# Patient Record
Sex: MALE | Race: BLACK OR AFRICAN AMERICAN | NOT HISPANIC OR LATINO
[De-identification: names, ages, dates, MRNs, and addresses within clinical notes are randomized per-mention and may not be internally consistent; named-entity substitution may affect disease eponyms.]

---

## 2021-02-14 ENCOUNTER — FORM ENCOUNTER (OUTPATIENT)
Age: 86
End: 2021-02-14

## 2021-02-15 ENCOUNTER — TRANSCRIPTION ENCOUNTER (OUTPATIENT)
Age: 86
End: 2021-02-15

## 2021-02-15 PROBLEM — Z00.00 ENCOUNTER FOR PREVENTIVE HEALTH EXAMINATION: Status: ACTIVE | Noted: 2021-02-15

## 2021-02-16 ENCOUNTER — APPOINTMENT (OUTPATIENT)
Dept: DISASTER EMERGENCY | Facility: HOSPITAL | Age: 86
End: 2021-02-16

## 2021-02-16 ENCOUNTER — OUTPATIENT (OUTPATIENT)
Dept: OUTPATIENT SERVICES | Facility: HOSPITAL | Age: 86
LOS: 1 days | End: 2021-02-16
Payer: MEDICARE

## 2021-02-16 VITALS
OXYGEN SATURATION: 99 % | HEART RATE: 74 BPM | DIASTOLIC BLOOD PRESSURE: 69 MMHG | RESPIRATION RATE: 16 BRPM | TEMPERATURE: 100 F | SYSTOLIC BLOOD PRESSURE: 106 MMHG

## 2021-02-16 VITALS
OXYGEN SATURATION: 98 % | DIASTOLIC BLOOD PRESSURE: 72 MMHG | SYSTOLIC BLOOD PRESSURE: 118 MMHG | HEART RATE: 98 BPM | WEIGHT: 139.99 LBS | RESPIRATION RATE: 16 BRPM | TEMPERATURE: 98 F | HEIGHT: 72 IN

## 2021-02-16 DIAGNOSIS — U07.1 COVID-19: ICD-10-CM

## 2021-02-16 PROCEDURE — M0239: CPT

## 2021-02-16 RX ORDER — SODIUM CHLORIDE 9 MG/ML
250 INJECTION INTRAMUSCULAR; INTRAVENOUS; SUBCUTANEOUS
Refills: 0 | Status: DISCONTINUED | OUTPATIENT
Start: 2021-02-16 | End: 2021-03-02

## 2021-02-16 RX ORDER — BAMLANIVIMAB 35 MG/ML
700 INJECTION, SOLUTION INTRAVENOUS ONCE
Refills: 0 | Status: COMPLETED | OUTPATIENT
Start: 2021-02-16 | End: 2021-02-16

## 2021-02-16 RX ADMIN — BAMLANIVIMAB 270 MILLIGRAM(S): 35 INJECTION, SOLUTION INTRAVENOUS at 09:35

## 2021-02-16 RX ADMIN — SODIUM CHLORIDE 25 MILLILITER(S): 9 INJECTION INTRAMUSCULAR; INTRAVENOUS; SUBCUTANEOUS at 10:39

## 2021-02-16 NOTE — CHART NOTE - NSCHARTNOTEFT_GEN_A_CORE
CC: Monoclonal Antibody Infusion/COVID 19 Positive  87yMale with a PMHx of dementia, Brain injury, ?stroke (~2 years ago). Testing positive for COVID 2/11/21 with symptom onset 2/10/21. C/o fever, cough, malaise, body ached, fatigue. He presents today for monoclonal antibody infusion referred by Dr. Sharma (PMD).     Vitals:  T(C): 36.9 (02-16-21 @ 09:34), Max: 36.9 (02-16-21 @ 09:19)  HR: 78 (02-16-21 @ 09:34) (78 - 98)  BP: 105/67 (02-16-21 @ 09:34) (105/67 - 118/72)  RR: 16 (02-16-21 @ 09:34) (16 - 16)  SpO2: 97% (02-16-21 @ 09:34) (97% - 98%)      PE:   Appearance: NAD	  Cardiovascular: Normal S1 S2  Respiratory: Lungs clear to auscultation	  Gastrointestinal:  Soft, Non-tender  Skin: warm and dry      ASSESSMENT:  Pt is a Covid +>>>>>>>  who presents to infusion center for Monoclonal antibody infusion (Bamlanivimab)  Symptoms/ Criteria: Onset   Covid + fever, cough, malaise, body ached, fatigue  Risk Profile includes: age > 65, dementia, hx of stroke    PLAN:  - infusion procedure explained to patient   -Consent for monoclonal antibody infusion obtained   - Risk & benefits discussed/all questions answered  -infuse  Bamlanivimab 700mg  IV over one hour   -observe patient for one hour post infusion      Discharge:  Patient tolerated infusion well denies complaints of chest pain/SOB/dizziness/  palpitations.  Vital signs stable for discharge home.  D/C instructions given/ fact sheet included.  Patient to follow-up with PCP as needed.

## 2021-02-17 ENCOUNTER — TRANSCRIPTION ENCOUNTER (OUTPATIENT)
Age: 86
End: 2021-02-17

## 2022-03-29 ENCOUNTER — APPOINTMENT (OUTPATIENT)
Dept: VASCULAR SURGERY | Facility: CLINIC | Age: 87
End: 2022-03-29
Payer: MEDICARE

## 2022-03-29 VITALS
WEIGHT: 155 LBS | SYSTOLIC BLOOD PRESSURE: 107 MMHG | BODY MASS INDEX: 21.7 KG/M2 | HEIGHT: 71 IN | HEART RATE: 79 BPM | DIASTOLIC BLOOD PRESSURE: 63 MMHG

## 2022-03-29 PROCEDURE — 99204 OFFICE O/P NEW MOD 45 MIN: CPT

## 2022-03-29 PROCEDURE — 93923 UPR/LXTR ART STDY 3+ LVLS: CPT

## 2022-03-29 NOTE — PHYSICAL EXAM
[JVD] : no jugular venous distention  [Normal Breath Sounds] : Normal breath sounds [Normal Rate and Rhythm] : normal rate and rhythm [2+] : left 2+ [Ankle Swelling (On Exam)] : not present [Varicose Veins Of Lower Extremities] : not present [] : not present [Abdomen Tenderness] : ~T ~M No abdominal tenderness [No Rash or Lesion] : No rash or lesion [Skin Ulcer] : no ulcer [Alert] : alert [Calm] : calm [de-identified] : Appears well

## 2022-03-29 NOTE — HISTORY OF PRESENT ILLNESS
[FreeTextEntry1] : 88-year-old gentleman with dementia, non-smoker presents to the office with worsening ambulation.  Patient now has issues with balance and requires use of a walker.  Patient does not have any history of ulcerations or pain in his feet.  He has a history of a pacemaker for which she is on anticoagulation.  He presents for evaluation.

## 2022-03-29 NOTE — ASSESSMENT
[FreeTextEntry1] : In the office today patient underwent arterial Dopplers which were within normal limits.  Most likely his difficulty in ambulating is due to arthritis with worsening dementia.  No vascular intervention necessary at this time.  Patient should remain on his Xarelto for cardiac issues. [Arterial/Venous Disease] : arterial/venous disease

## 2023-08-24 ENCOUNTER — INPATIENT (INPATIENT)
Facility: HOSPITAL | Age: 88
LOS: 6 days | Discharge: HOME HEALTH | DRG: 872 | End: 2023-08-31
Attending: STUDENT IN AN ORGANIZED HEALTH CARE EDUCATION/TRAINING PROGRAM | Admitting: HOSPITALIST
Payer: MEDICARE

## 2023-08-24 VITALS
TEMPERATURE: 98 F | DIASTOLIC BLOOD PRESSURE: 74 MMHG | OXYGEN SATURATION: 98 % | RESPIRATION RATE: 20 BRPM | SYSTOLIC BLOOD PRESSURE: 107 MMHG | HEART RATE: 86 BPM | HEIGHT: 73 IN | WEIGHT: 169.98 LBS

## 2023-08-24 DIAGNOSIS — N39.0 URINARY TRACT INFECTION, SITE NOT SPECIFIED: ICD-10-CM

## 2023-08-24 LAB
ALBUMIN SERPL ELPH-MCNC: 3.3 G/DL — SIGNIFICANT CHANGE UP (ref 3.3–5)
ALP SERPL-CCNC: 54 U/L — SIGNIFICANT CHANGE UP (ref 40–120)
ALT FLD-CCNC: 10 U/L — SIGNIFICANT CHANGE UP (ref 10–45)
ANION GAP SERPL CALC-SCNC: 14 MMOL/L — SIGNIFICANT CHANGE UP (ref 5–17)
APPEARANCE UR: ABNORMAL
APTT BLD: 34.5 SEC — SIGNIFICANT CHANGE UP (ref 24.5–35.6)
AST SERPL-CCNC: 23 U/L — SIGNIFICANT CHANGE UP (ref 10–40)
BACTERIA # UR AUTO: ABNORMAL
BASOPHILS # BLD AUTO: 0.05 K/UL — SIGNIFICANT CHANGE UP (ref 0–0.2)
BASOPHILS NFR BLD AUTO: 0.4 % — SIGNIFICANT CHANGE UP (ref 0–2)
BILIRUB SERPL-MCNC: 0.8 MG/DL — SIGNIFICANT CHANGE UP (ref 0.2–1.2)
BILIRUB UR-MCNC: ABNORMAL
BLD GP AB SCN SERPL QL: NEGATIVE — SIGNIFICANT CHANGE UP
BUN SERPL-MCNC: 17 MG/DL — SIGNIFICANT CHANGE UP (ref 7–23)
CALCIUM SERPL-MCNC: 9.3 MG/DL — SIGNIFICANT CHANGE UP (ref 8.4–10.5)
CHLORIDE SERPL-SCNC: 104 MMOL/L — SIGNIFICANT CHANGE UP (ref 96–108)
CO2 SERPL-SCNC: 24 MMOL/L — SIGNIFICANT CHANGE UP (ref 22–31)
COLOR SPEC: ABNORMAL
CREAT SERPL-MCNC: 0.58 MG/DL — SIGNIFICANT CHANGE UP (ref 0.5–1.3)
DIFF PNL FLD: ABNORMAL
EGFR: 93 ML/MIN/1.73M2 — SIGNIFICANT CHANGE UP
EOSINOPHIL # BLD AUTO: 0.38 K/UL — SIGNIFICANT CHANGE UP (ref 0–0.5)
EOSINOPHIL NFR BLD AUTO: 3.1 % — SIGNIFICANT CHANGE UP (ref 0–6)
EPI CELLS # UR: 0 — SIGNIFICANT CHANGE UP
GLUCOSE SERPL-MCNC: 97 MG/DL — SIGNIFICANT CHANGE UP (ref 70–99)
GLUCOSE UR QL: ABNORMAL
HCT VFR BLD CALC: 37 % — LOW (ref 39–50)
HGB BLD-MCNC: 11.3 G/DL — LOW (ref 13–17)
HYALINE CASTS # UR AUTO: 0 /LPF — SIGNIFICANT CHANGE UP (ref 0–7)
IMM GRANULOCYTES NFR BLD AUTO: 0.7 % — SIGNIFICANT CHANGE UP (ref 0–0.9)
INR BLD: 1.36 RATIO — HIGH (ref 0.85–1.18)
KETONES UR-MCNC: ABNORMAL
LEUKOCYTE ESTERASE UR-ACNC: ABNORMAL
LIDOCAIN IGE QN: 11 U/L — SIGNIFICANT CHANGE UP (ref 7–60)
LYMPHOCYTES # BLD AUTO: 1.53 K/UL — SIGNIFICANT CHANGE UP (ref 1–3.3)
LYMPHOCYTES # BLD AUTO: 12.5 % — LOW (ref 13–44)
MCHC RBC-ENTMCNC: 26.5 PG — LOW (ref 27–34)
MCHC RBC-ENTMCNC: 30.5 GM/DL — LOW (ref 32–36)
MCV RBC AUTO: 86.7 FL — SIGNIFICANT CHANGE UP (ref 80–100)
MONOCYTES # BLD AUTO: 1.4 K/UL — HIGH (ref 0–0.9)
MONOCYTES NFR BLD AUTO: 11.4 % — SIGNIFICANT CHANGE UP (ref 2–14)
NEUTROPHILS # BLD AUTO: 8.84 K/UL — HIGH (ref 1.8–7.4)
NEUTROPHILS NFR BLD AUTO: 71.9 % — SIGNIFICANT CHANGE UP (ref 43–77)
NITRITE UR-MCNC: POSITIVE
NRBC # BLD: 0 /100 WBCS — SIGNIFICANT CHANGE UP (ref 0–0)
PH UR: >8.9 (ref 5–8)
PLATELET # BLD AUTO: 285 K/UL — SIGNIFICANT CHANGE UP (ref 150–400)
POTASSIUM SERPL-MCNC: 4.5 MMOL/L — SIGNIFICANT CHANGE UP (ref 3.5–5.3)
POTASSIUM SERPL-SCNC: 4.5 MMOL/L — SIGNIFICANT CHANGE UP (ref 3.5–5.3)
PROT SERPL-MCNC: 6.9 G/DL — SIGNIFICANT CHANGE UP (ref 6–8.3)
PROT UR-MCNC: ABNORMAL
PROTHROM AB SERPL-ACNC: 14.2 SEC — HIGH (ref 9.5–13)
RBC # BLD: 4.27 M/UL — SIGNIFICANT CHANGE UP (ref 4.2–5.8)
RBC # FLD: 15.6 % — HIGH (ref 10.3–14.5)
RBC CASTS # UR COMP ASSIST: >50 /HPF — HIGH (ref 0–4)
RH IG SCN BLD-IMP: POSITIVE — SIGNIFICANT CHANGE UP
SODIUM SERPL-SCNC: 142 MMOL/L — SIGNIFICANT CHANGE UP (ref 135–145)
SP GR SPEC: 1.03 — HIGH (ref 1.01–1.02)
UROBILINOGEN FLD QL: ABNORMAL
WBC # BLD: 12.28 K/UL — HIGH (ref 3.8–10.5)
WBC # FLD AUTO: 12.28 K/UL — HIGH (ref 3.8–10.5)
WBC UR QL: >50 /HPF — HIGH (ref 0–5)

## 2023-08-24 PROCEDURE — 99222 1ST HOSP IP/OBS MODERATE 55: CPT

## 2023-08-24 PROCEDURE — 99497 ADVNCD CARE PLAN 30 MIN: CPT | Mod: 25

## 2023-08-24 PROCEDURE — 74177 CT ABD & PELVIS W/CONTRAST: CPT | Mod: 26,MA

## 2023-08-24 PROCEDURE — 99285 EMERGENCY DEPT VISIT HI MDM: CPT

## 2023-08-24 RX ORDER — CEFTRIAXONE 500 MG/1
1000 INJECTION, POWDER, FOR SOLUTION INTRAMUSCULAR; INTRAVENOUS ONCE
Refills: 0 | Status: COMPLETED | OUTPATIENT
Start: 2023-08-24 | End: 2023-08-24

## 2023-08-24 RX ORDER — CEFTRIAXONE 500 MG/1
1000 INJECTION, POWDER, FOR SOLUTION INTRAMUSCULAR; INTRAVENOUS EVERY 24 HOURS
Refills: 0 | Status: DISCONTINUED | OUTPATIENT
Start: 2023-08-24 | End: 2023-08-28

## 2023-08-24 RX ORDER — SODIUM CHLORIDE 9 MG/ML
500 INJECTION, SOLUTION INTRAVENOUS
Refills: 0 | Status: COMPLETED | OUTPATIENT
Start: 2023-08-24 | End: 2023-08-25

## 2023-08-24 RX ORDER — FAMOTIDINE 10 MG/ML
1 INJECTION INTRAVENOUS
Refills: 0 | DISCHARGE

## 2023-08-24 RX ORDER — SODIUM CHLORIDE 9 MG/ML
500 INJECTION INTRAMUSCULAR; INTRAVENOUS; SUBCUTANEOUS ONCE
Refills: 0 | Status: COMPLETED | OUTPATIENT
Start: 2023-08-24 | End: 2023-08-24

## 2023-08-24 RX ADMIN — CEFTRIAXONE 100 MILLIGRAM(S): 500 INJECTION, POWDER, FOR SOLUTION INTRAMUSCULAR; INTRAVENOUS at 23:04

## 2023-08-24 RX ADMIN — SODIUM CHLORIDE 500 MILLILITER(S): 9 INJECTION INTRAMUSCULAR; INTRAVENOUS; SUBCUTANEOUS at 23:06

## 2023-08-24 NOTE — ED ADULT NURSE REASSESSMENT NOTE - NS ED NURSE REASSESS COMMENT FT1
Report received from HILARIO Muller. PT is A&Ox2 at baseline. PT daughter at bedside. Pt denies any pain or discomfort at present time. Pt Monroe draining bright red blood 200cc present. Spontaneously breathing on RA>95%, skin dry and intact, peripheral pulses present. Safety and comfort measures in place bed in lowest position, siderails, upright and call bell within reach.

## 2023-08-24 NOTE — H&P ADULT - HISTORY OF PRESENT ILLNESS
89M dementia A&Ox1 baseline, Afib on xarelto, PPM placement, pleural plaques, aspiration pna in past, chronic cough, chronic mild leg swelling, pw gross hematuria.    History from pt's daughter/HCP Luba La at bedside. Pt is poor historian 2/2 dementia and drowsiness.  At baseline pt walks with walker and assistance, feeds self, able to make some needs known. Developed gross hematuria about 1 week ago, was started on macrobid on 8/16/23, which pt is currently taking, and told to stop xarelto for 2 days. Pt resumed xarelto about 6 days ago, and pt developed gross hematuria starting yesterday with clots. Pt also complaining of intermittent abd pain and flank pain. Denies fevers, chills, URI symptoms. Last BM 2 days ago.

## 2023-08-24 NOTE — ED PROVIDER NOTE - PHYSICAL EXAMINATION
GENERAL: Awake, alert, NAD  HEENT: NC/AT, moist mucous membranes, PERRL, EOMI  LUNGS: CTAB, no wheezes or crackles   CARDIAC: RRR, no m/r/g  ABDOMEN: Soft, Diffusely tender, non distended, no rebound, no guarding  BACK: No midline spinal tenderness, no CVA tenderness  EXT: No edema, no calf tenderness, 2+ DP pulses bilaterally, no deformities.  NEURO: A&Ox2. Moving all extremities.  SKIN: Warm and dry. No rash.  PSYCH: Normal affect.

## 2023-08-24 NOTE — ED PROVIDER NOTE - OBJECTIVE STATEMENT
89-year-old male history of dementia and A-fib on Xarelto presenting with a chief complaint of hematuria.  Patient's hematuria began 1 week ago and at that time he was prescribed antibiotics for suspected urinary tract infection.  Daughter is at bedside and gives the history.  Hematuria did stop but then returned again this morning.  Patient was advised to present to the hospital.  Patient also having abdominal pain.  Unsure of when abdominal pain began.    ROS is limited secondary to patient's dementia.

## 2023-08-24 NOTE — H&P ADULT - PROBLEM SELECTOR PLAN 1
- hold xarelto  - likely 2/2 hemorrhagic cystitis  - monitor hb, type and screen  - hold off CBI  - trend cr  - IVF - hold xarelto  - likely 2/2 hemorrhagic cystitis  - monitor hb, type and screen  - hold off CBI  - trend cr  - IVF  - TOV in AM  - f/u urol as outpt

## 2023-08-24 NOTE — H&P ADULT - BILLING PROVIDER USE ONLY
Start metamucil/benefiber and fibercon capsules.  Increase water intake.    Start B12 supplement (nature made is a good brand).    Start exercising ASAP.   Attending or LETICIA Only

## 2023-08-24 NOTE — ED PROVIDER NOTE - CLINICAL SUMMARY MEDICAL DECISION MAKING FREE TEXT BOX
Differential is not limited to malignancy, urinary tract infection, bleeding secondary to Xarelto use, nephrolithiasis.  Will obtain basic labs, coags, type and screen, CT abdomen and pelvis.  Dispo pending labs imaging and reassessment.  Will consider putting Monroe in patient if patient cannot urinate. Differential is not limited to malignancy, urinary tract infection, bleeding secondary to Xarelto use, nephrolithiasis.  Will obtain basic labs, coags, type and screen, CT abdomen and pelvis.  Dispo pending labs imaging and reassessment.  Will consider putting Monroe in patient if patient cannot urinate.    ALEX Ramirez MD: Agree with resident/ACP MDM, assessment and plan as above.

## 2023-08-24 NOTE — ED ADULT NURSE NOTE - CHPI ED NUR SYMPTOMS POS
BURNING URINATION/DYSURIA/HEMATURIA/PAINFUL URINATION BURNING URINATION/DYSURIA/HEMATURIA/PAIN/PAINFUL URINATION

## 2023-08-24 NOTE — ED ADULT NURSE NOTE - OBJECTIVE STATEMENT
Patient  is  alert  and  oriented x3.  Color is good and skin warm to touch.  He  is  c/o dysuria, hematuria and pain .Patient is on Xarelto. He  denies  any fever. Patient  is  alert  and  oriented x2.  Color is good and skin warm to touch.  He  is  c/o dysuria, hematuria and right flank pain .Patient is on Xarelto. He  denies  any fever.

## 2023-08-24 NOTE — H&P ADULT - NSHPREVIEWOFSYSTEMS_GEN_ALL_CORE
REVIEW OF SYSTEMS:  CONSTITUTIONAL: No weakness. No fevers. No chills. No rigors. +poor appetit for 2 months.  EYES: No blurry or double vision. No eye pain.  ENT: No hearing difficulty. No sore throat. No Sinusitis/rhinorrhea.   NECK: No pain. No stiffness/rigidity.  CARDIAC: No chest pain. No palpitations. No lightheadedness. No syncope.  RESPIRATORY: No cough. No SOB.   GASTROINTESTINAL: +abdominal pain. No nausea. No vomiting. No diarrhea. +constipation.   GENITOURINARY: No dysuria. No frequency. No oliguria. +hematuria  NEUROLOGICAL: No numbness/tingling. No focal weakness. No headache. +unsteady gait.  BACK: No back pain. +flank pain.  EXTREMITIES: +lower extremity edema. Full ROM. No joint pain.    ALLERGIC: No lip swelling. No hives.  All other review of systems is negative unless indicated above.  Unless indicated above, unable to assess ROS 2/2

## 2023-08-24 NOTE — H&P ADULT - NSHPLABSRESULTS_GEN_ALL_CORE
Personally reviewed old records.  Personally reviewed labs.  Personally reviewed imaging. CT with mod RLL pleural effusion, right basilar atelectasis                          11.3   12.28 )-----------( 285      ( 24 Aug 2023 14:26 )             37.0       08    142  |  104  |  17  ----------------------------<  97  4.5   |  24  |  0.58    Ca    9.3      24 Aug 2023 14:26    TPro  6.9  /  Alb  3.3  /  TBili  0.8  /  DBili  x   /  AST  23  /  ALT  10  /  AlkPhos  54              LIVER FUNCTIONS - ( 24 Aug 2023 14:26 )  Alb: 3.3 g/dL / Pro: 6.9 g/dL / ALK PHOS: 54 U/L / ALT: 10 U/L / AST: 23 U/L / GGT: x             PT/INR - ( 24 Aug 2023 14:26 )   PT: 14.2 sec;   INR: 1.36 ratio         PTT - ( 24 Aug 2023 14:26 )  PTT:34.5 sec    Urinalysis Basic - ( 24 Aug 2023 18:58 )    Color: Brown / Appearance: Slightly Turbid / S.035 / pH: x  Gluc: x / Ketone: Small  / Bili: Large / Urobili: 4 mg/dL   Blood: x / Protein: 300 mg/dL / Nitrite: Positive   Leuk Esterase: Large / RBC: >50 /hpf / WBC >50 /HPF   Sq Epi: x / Non Sq Epi: x / Bacteria: Occasional

## 2023-08-24 NOTE — H&P ADULT - PROBLEM SELECTOR PLAN 6
- poss from pleural plaques vs  parapneumonic effusion vs other  - informed pt's daughter that if pt becomes more symptomatic respiratory wise, can get thoracentesis  - hold off workup for malignancy given pt's age and comorbidities, which pt's daughter is in agreement with

## 2023-08-24 NOTE — H&P ADULT - CONVERSATION DETAILS
soham mcgovern requests DNR but NOT DNI for patient. OK for trial of intubation. OK for noninvasive positive pressure  soham mcgovern requests all medical therapy as necessary and indicated

## 2023-08-24 NOTE — ED ADULT NURSE NOTE - NSFALLHARMRISKINTERV_ED_ALL_ED

## 2023-08-24 NOTE — H&P ADULT - PROBLEM SELECTOR PLAN 3
- monitor for hypothermia, trend wbc  - likely 2/2 uti - monitor for hypothermia, trend wbc  - likely 2/2 uti  - blood cultures ordered as pt c/o flank pain

## 2023-08-24 NOTE — H&P ADULT - NSHPPHYSICALEXAM_GEN_ALL_CORE
PHYSICAL EXAM:   GENERAL: Alert. Not confused. No acute distress. +thin. Not cachectic. Not obese.  HEAD:  Atraumatic. Normocephalic.  EYES: EOMI. PERRLA. Normal conjunctiva/sclera.  ENT: Neck supple. No JVD. Moist oral mucosa. Not edentulous. No thrush.  LYMPH: Normal supraclavicular/cervical lymph nodes.   CARDIAC: Not tachy, Not tereza. Regular rhythm. Not irregularly irregular. S1. S2. No murmur. No rub. No distant heart sounds.  LUNG/CHEST: BS equal bilaterally. No wheezes. +rales. No rhonchi.  ABDOMEN: Soft. No tenderness. No distension. No fluid wave. Normal bowel sounds.  BACK: No midline/vertebral tenderness. No flank tenderness.  VASCULAR: +2 b/l radial or ulnar pulses. Palpable DP pulses.  EXTREMITIES:  No clubbing. No cyanosis. No edema. Moving all 4.  NEUROLOGY: A&Ox1. Non-focal exam. answers only some questions with 1 word answers. Sensation intact.  PSYCH: Normal behavior. Flat affect.    T(C): 36.9 (24 Aug 2023 23:50), Max: 37.2 (24 Aug 2023 18:26)  T(F): 98.5 (24 Aug 2023 23:50), Max: 98.9 (24 Aug 2023 18:26)  HR: 74 (24 Aug 2023 23:50) (70 - 86)  BP: 117/68 (24 Aug 2023 23:50) (103/67 - 124/85)  BP(mean): 87 (24 Aug 2023 23:30) (87 - 91)  ABP: --  ABP(mean): --  RR: 18 (24 Aug 2023 23:50) (18 - 20)  SpO2: 92% (24 Aug 2023 23:50) (92% - 98%)    O2 Parameters below as of 24 Aug 2023 23:50  Patient On (Oxygen Delivery Method): room air          I&O's Summary

## 2023-08-24 NOTE — H&P ADULT - ASSESSMENT
89M dementia A&Ox1 baseline, Afib on xarelto, PPM placement, pleural plaques, aspiration pna in past, chronic cough, chronic mild leg swelling, pw gross hematuria, uti, sepsis
No

## 2023-08-25 DIAGNOSIS — N39.0 URINARY TRACT INFECTION, SITE NOT SPECIFIED: ICD-10-CM

## 2023-08-25 DIAGNOSIS — J90 PLEURAL EFFUSION, NOT ELSEWHERE CLASSIFIED: ICD-10-CM

## 2023-08-25 DIAGNOSIS — F03.90 UNSPECIFIED DEMENTIA WITHOUT BEHAVIORAL DISTURBANCE: ICD-10-CM

## 2023-08-25 DIAGNOSIS — I48.20 CHRONIC ATRIAL FIBRILLATION, UNSPECIFIED: ICD-10-CM

## 2023-08-25 DIAGNOSIS — R31.0 GROSS HEMATURIA: ICD-10-CM

## 2023-08-25 DIAGNOSIS — Z29.9 ENCOUNTER FOR PROPHYLACTIC MEASURES, UNSPECIFIED: ICD-10-CM

## 2023-08-25 DIAGNOSIS — A41.9 SEPSIS, UNSPECIFIED ORGANISM: ICD-10-CM

## 2023-08-25 LAB
ALBUMIN SERPL ELPH-MCNC: 3.2 G/DL — LOW (ref 3.3–5)
ALP SERPL-CCNC: 49 U/L — SIGNIFICANT CHANGE UP (ref 40–120)
ALT FLD-CCNC: 6 U/L — LOW (ref 10–45)
ANION GAP SERPL CALC-SCNC: 12 MMOL/L — SIGNIFICANT CHANGE UP (ref 5–17)
AST SERPL-CCNC: 9 U/L — LOW (ref 10–40)
BASOPHILS # BLD AUTO: 0.03 K/UL — SIGNIFICANT CHANGE UP (ref 0–0.2)
BASOPHILS NFR BLD AUTO: 0.3 % — SIGNIFICANT CHANGE UP (ref 0–2)
BILIRUB SERPL-MCNC: 0.6 MG/DL — SIGNIFICANT CHANGE UP (ref 0.2–1.2)
BUN SERPL-MCNC: 13 MG/DL — SIGNIFICANT CHANGE UP (ref 7–23)
CALCIUM SERPL-MCNC: 8.9 MG/DL — SIGNIFICANT CHANGE UP (ref 8.4–10.5)
CHLORIDE SERPL-SCNC: 109 MMOL/L — HIGH (ref 96–108)
CK SERPL-CCNC: 12 U/L — LOW (ref 30–200)
CO2 SERPL-SCNC: 26 MMOL/L — SIGNIFICANT CHANGE UP (ref 22–31)
CREAT SERPL-MCNC: 0.58 MG/DL — SIGNIFICANT CHANGE UP (ref 0.5–1.3)
EGFR: 93 ML/MIN/1.73M2 — SIGNIFICANT CHANGE UP
EOSINOPHIL # BLD AUTO: 0.39 K/UL — SIGNIFICANT CHANGE UP (ref 0–0.5)
EOSINOPHIL NFR BLD AUTO: 3.8 % — SIGNIFICANT CHANGE UP (ref 0–6)
GLUCOSE SERPL-MCNC: 98 MG/DL — SIGNIFICANT CHANGE UP (ref 70–99)
HCT VFR BLD CALC: 31.7 % — LOW (ref 39–50)
HGB BLD-MCNC: 9.8 G/DL — LOW (ref 13–17)
IMM GRANULOCYTES NFR BLD AUTO: 0.6 % — SIGNIFICANT CHANGE UP (ref 0–0.9)
LYMPHOCYTES # BLD AUTO: 1.14 K/UL — SIGNIFICANT CHANGE UP (ref 1–3.3)
LYMPHOCYTES # BLD AUTO: 11.2 % — LOW (ref 13–44)
MAGNESIUM SERPL-MCNC: 2.1 MG/DL — SIGNIFICANT CHANGE UP (ref 1.6–2.6)
MCHC RBC-ENTMCNC: 26.5 PG — LOW (ref 27–34)
MCHC RBC-ENTMCNC: 30.9 GM/DL — LOW (ref 32–36)
MCV RBC AUTO: 85.7 FL — SIGNIFICANT CHANGE UP (ref 80–100)
MONOCYTES # BLD AUTO: 1.4 K/UL — HIGH (ref 0–0.9)
MONOCYTES NFR BLD AUTO: 13.7 % — SIGNIFICANT CHANGE UP (ref 2–14)
NEUTROPHILS # BLD AUTO: 7.18 K/UL — SIGNIFICANT CHANGE UP (ref 1.8–7.4)
NEUTROPHILS NFR BLD AUTO: 70.4 % — SIGNIFICANT CHANGE UP (ref 43–77)
NRBC # BLD: 0 /100 WBCS — SIGNIFICANT CHANGE UP (ref 0–0)
PHOSPHATE SERPL-MCNC: 3.1 MG/DL — SIGNIFICANT CHANGE UP (ref 2.5–4.5)
PLATELET # BLD AUTO: 279 K/UL — SIGNIFICANT CHANGE UP (ref 150–400)
POTASSIUM SERPL-MCNC: 4.5 MMOL/L — SIGNIFICANT CHANGE UP (ref 3.5–5.3)
POTASSIUM SERPL-SCNC: 4.5 MMOL/L — SIGNIFICANT CHANGE UP (ref 3.5–5.3)
PROT SERPL-MCNC: 6.3 G/DL — SIGNIFICANT CHANGE UP (ref 6–8.3)
RBC # BLD: 3.7 M/UL — LOW (ref 4.2–5.8)
RBC # FLD: 15.5 % — HIGH (ref 10.3–14.5)
SODIUM SERPL-SCNC: 147 MMOL/L — HIGH (ref 135–145)
WBC # BLD: 10.2 K/UL — SIGNIFICANT CHANGE UP (ref 3.8–10.5)
WBC # FLD AUTO: 10.2 K/UL — SIGNIFICANT CHANGE UP (ref 3.8–10.5)

## 2023-08-25 PROCEDURE — 99233 SBSQ HOSP IP/OBS HIGH 50: CPT

## 2023-08-25 PROCEDURE — 99222 1ST HOSP IP/OBS MODERATE 55: CPT

## 2023-08-25 RX ORDER — CHLORHEXIDINE GLUCONATE 213 G/1000ML
1 SOLUTION TOPICAL DAILY
Refills: 0 | Status: DISCONTINUED | OUTPATIENT
Start: 2023-08-25 | End: 2023-08-31

## 2023-08-25 RX ORDER — SENNA PLUS 8.6 MG/1
2 TABLET ORAL AT BEDTIME
Refills: 0 | Status: DISCONTINUED | OUTPATIENT
Start: 2023-08-25 | End: 2023-08-31

## 2023-08-25 RX ORDER — ACETAMINOPHEN 500 MG
1000 TABLET ORAL ONCE
Refills: 0 | Status: COMPLETED | OUTPATIENT
Start: 2023-08-25 | End: 2023-08-25

## 2023-08-25 RX ORDER — POLYETHYLENE GLYCOL 3350 17 G/17G
17 POWDER, FOR SOLUTION ORAL DAILY
Refills: 0 | Status: DISCONTINUED | OUTPATIENT
Start: 2023-08-25 | End: 2023-08-31

## 2023-08-25 RX ORDER — FAMOTIDINE 10 MG/ML
20 INJECTION INTRAVENOUS DAILY
Refills: 0 | Status: DISCONTINUED | OUTPATIENT
Start: 2023-08-25 | End: 2023-08-31

## 2023-08-25 RX ORDER — SODIUM CHLORIDE 9 MG/ML
1000 INJECTION, SOLUTION INTRAVENOUS
Refills: 0 | Status: DISCONTINUED | OUTPATIENT
Start: 2023-08-25 | End: 2023-08-25

## 2023-08-25 RX ADMIN — POLYETHYLENE GLYCOL 3350 17 GRAM(S): 17 POWDER, FOR SOLUTION ORAL at 12:45

## 2023-08-25 RX ADMIN — Medication 400 MILLIGRAM(S): at 17:05

## 2023-08-25 RX ADMIN — FAMOTIDINE 20 MILLIGRAM(S): 10 INJECTION INTRAVENOUS at 12:45

## 2023-08-25 RX ADMIN — SODIUM CHLORIDE 100 MILLILITER(S): 9 INJECTION, SOLUTION INTRAVENOUS at 06:09

## 2023-08-25 RX ADMIN — SODIUM CHLORIDE 50 MILLILITER(S): 9 INJECTION, SOLUTION INTRAVENOUS at 14:22

## 2023-08-25 RX ADMIN — CEFTRIAXONE 100 MILLIGRAM(S): 500 INJECTION, POWDER, FOR SOLUTION INTRAMUSCULAR; INTRAVENOUS at 06:08

## 2023-08-25 RX ADMIN — SENNA PLUS 2 TABLET(S): 8.6 TABLET ORAL at 21:44

## 2023-08-25 RX ADMIN — Medication 1 DROP(S): at 14:29

## 2023-08-25 RX ADMIN — Medication 1 DROP(S): at 21:44

## 2023-08-25 RX ADMIN — Medication 1000 MILLIGRAM(S): at 18:00

## 2023-08-25 RX ADMIN — Medication 1 DROP(S): at 05:32

## 2023-08-25 NOTE — SWALLOW BEDSIDE ASSESSMENT ADULT - PHARYNGEAL PHASE
Reduced hyo-laryngeal elevation upon palpation with timely initiation of pharyngeal swallow. No overt s/s of aspiration/penetration noted. No changes in vocal quality. Reduced hyo-laryngeal elevation noted upon palpation. Timely initiation of the pharyngeal swallow. Patient with spontaneous repeat swallow. No s/s of aspiration/penetration or changes in vocal quality noted s/p textures trialed.

## 2023-08-25 NOTE — PHYSICAL THERAPY INITIAL EVALUATION ADULT - ADDITIONAL COMMENTS
Pt lives with daughter on 3rd floor of 2 family house. 4 stairs and then 1 flight to enter. Was ambulating household distances with rolling walker and assist. Has HHA 8 hrs on weekdays and 10 hours on weekends, daughter is present at other times. Owns rolling walker, wheelchair, seat in shower, grab bars. Pt lives with daughter on 3rd floor of 2 family house. 4 stairs and then 1 flight to enter. Was ambulating household distances with rolling walker and assist. Has HHA 8 hrs on weekdays and 10 hours on weekends, daughter is present at other times. Owns rolling walker, wheelchair, seat in shower, grab bars. Daughter stating considering putting in stair lift for single flight of stairs.

## 2023-08-25 NOTE — PROGRESS NOTE ADULT - SUBJECTIVE AND OBJECTIVE BOX
UROLOGY NOTE:     Subjective: 89M dementia A&Ox1 baseline, Afib on xarelto, PPM placement, pleural plaques admitted with gross hematuria. History obtained from chart review given pts mental status. Developed gross hematuria 1 week ago, was started on macrobid on 8/16/23,  and told to stop xarelto for 2 days. Pt resumed Xarelto 6 days ago and started having gross hematuria again. Pt complaining of intermittent abd pain and flank pain. CT scan showed no obstruction or hydronephrosis, bladder wall thickening. Denies fevers, chills. Urology called to evaluate hematuria and need for CBI.    Objective:  Vital signs  T(F): , Max: 98.5 (08-24-23 @ 23:50)  HR: 76 (08-25-23 @ 20:12)  BP: 109/58 (08-25-23 @ 20:12)  SpO2: 96% (08-25-23 @ 20:12)  Wt(kg): --    I&O's Summary    24 Aug 2023 07:01  -  25 Aug 2023 07:00  --------------------------------------------------------  IN: 0 mL / OUT: 250 mL / NET: -250 mL    25 Aug 2023 07:01  -  25 Aug 2023 21:34  --------------------------------------------------------  IN: 150 mL / OUT: 400 mL / NET: -250 mL    Gen: NAD  Pulm: No respiratory distress, no subcostal retractions  CV: RRR, no JVD  Abd: Soft, NT, ND  : Monroe draining dark yellow urine with no clots or visible gross hematuria, urine in bag is light tea colored     Labs:  08-25  10.20 / 31.7  /0.58   08-24  12.28 / 37.0  /0.58     CBC                        9.8    10.20 )-----------( 279      ( 25 Aug 2023 06:58 )             31.7     08-25-BMP    147<H>  |  109<H>  |  13  ----------------------------<  98  4.5   |  26  |  0.58    Ca    8.9      25 Aug 2023 06:58  Phos  3.1     08-25  Mg     2.1     08-25    TPro  6.3  /  Alb  3.2<L>  /  TBili  0.6  /  DBili  x   /  AST  9<L>  /  ALT  6<L>  /  AlkPhos  49  08-25    PT/INR - ( 24 Aug 2023 14:26 )   PT: 14.2 sec;   INR: 1.36 ratio         PTT - ( 24 Aug 2023 14:26 )  PTT:34.5 sec    Urine Cx: pending     < from: CT Abdomen and Pelvis w/ IV Cont (08.24.23 @ 17:57) >  IMPRESSION:  Urinary bladder wall thickening and inflammatory change, suspicious for   cystitis. Correlate with urinalysis.    Bilateral renal cortical scarring. No hydronephrosis.    A moderate right pleural effusion. Bilateral calcified pleural plaques.    < end of copied text >

## 2023-08-25 NOTE — PHYSICAL THERAPY INITIAL EVALUATION ADULT - PERTINENT HX OF CURRENT PROBLEM, REHAB EVAL
89 y/oM admitted 8/24 p/w gross hematuria, UTI, sepsis. As per H&P, at  baseline pt walks with walker and assistance, feeds self, able to make some needs known. Developed gross hematuria about 1 week ago, was started on macrobid on 8/16/23, which pt is currently taking, and told to stop xarelto for 2 days. Pt resumed xarelto about 6 days ago, and pt developed gross hematuria starting yesterday with clots. Pt also complaining of intermittent abd pain and flank pain. PMH dementia A&Ox1 baseline, Afib on xarelto, PPM placement, pleural plaques, aspiration pna in past, chronic cough, chronic mild leg swelling 89 y/oM admitted 8/24 p/w gross hematuria, UTI, sepsis. As per H&P, at  baseline pt walks with walker and assistance, feeds self, able to make some needs known. Developed gross hematuria about 1 week ago, was started on macrobid on 8/16/23, which pt is currently taking, and told to stop xarelto for 2 days. Pt resumed xarelto about 6 days ago, and pt developed gross hematuria starting yesterday with clots. Pt also complaining of intermittent abd pain and flank pain. PMH dementia A&Ox1 baseline, Afib on xarelto, PPM placement, pleural plaques, aspiration pna in past, chronic cough, chronic mild leg swelling Ct A/P mod R pleural effusion

## 2023-08-25 NOTE — SWALLOW BEDSIDE ASSESSMENT ADULT - DIET PRIOR TO ADMI
Soft and bite sized; thin liquids.                                                  NO KNOWN ALLERGIES

## 2023-08-25 NOTE — SWALLOW BEDSIDE ASSESSMENT ADULT - CONSISTENCIES ADMINISTERED
pureed/minced & moist thin liquid/moderately thick/mildly thick No additional textures administered due to prolonged manipulation of bolus on minced & moist consistency. Concerns surrounding mastication efficiency on soft & bite sized, easy to chew, and regular textures 2/2 incomplete dentition.

## 2023-08-25 NOTE — SWALLOW BEDSIDE ASSESSMENT ADULT - SLP PERTINENT HISTORY OF CURRENT PROBLEM
89M dementia A&Ox1 baseline, Afib on xarelto, PPM placement, pleural plaques, aspiration pna in past, chronic cough, chronic mild leg swelling, pw gross hematuria.

## 2023-08-25 NOTE — SWALLOW BEDSIDE ASSESSMENT ADULT - SWALLOW EVAL: RECOMMENDED FEEDING/EATING TECHNIQUES
allow for swallow between intakes/check mouth frequently for oral residue/pocketing/crush medication (when feasible)/maintain upright posture during/after eating for 30 mins/small sips/bites

## 2023-08-25 NOTE — PROGRESS NOTE ADULT - PROBLEM SELECTOR PLAN 5
- aspiration precaution  - swallow eval complete -> rec minced and moist  - pt eval complete -> rec Home PT

## 2023-08-25 NOTE — PROGRESS NOTE ADULT - PROBLEM SELECTOR PLAN 1
Likely 2/2 hemorrhagic cystitis    - hold xarelto  - monitor hgb  - trend cr  - c/w IVF  - TOV  - Urology c/s for CBI

## 2023-08-25 NOTE — SWALLOW BEDSIDE ASSESSMENT ADULT - ASR SWALLOW LABIAL MOBILITY
Patient exhibited difficulty with following verbal commands; benefited from visual cues/clinician modeling./within functional limits

## 2023-08-25 NOTE — PROGRESS NOTE ADULT - SUBJECTIVE AND OBJECTIVE BOX
************************  Johnathon Montenegro MD  Internal Medicine PGY-1  ************************    Patient is a 89y old  Male who presents with a chief complaint of gross hematuria (24 Aug 2023 23:37)    Overnight no events. Patient with coffey in place with BM(s). AOx0, unable to complete ROS    MEDICATIONS  (STANDING):  artificial tears (preservative free) Ophthalmic Solution 1 Drop(s) Both EYES three times a day  cefTRIAXone   IVPB 1000 milliGRAM(s) IV Intermittent every 24 hours  chlorhexidine 4% Liquid 1 Application(s) Topical daily  famotidine    Tablet 20 milliGRAM(s) Oral daily  lactated ringers. 1000 milliLiter(s) (50 mL/Hr) IV Continuous <Continuous>  polyethylene glycol 3350 17 Gram(s) Oral daily  senna 2 Tablet(s) Oral at bedtime    MEDICATIONS  (PRN):      CAPILLARY BLOOD GLUCOSE        I&O's Summary    24 Aug 2023 07:01  -  25 Aug 2023 07:00  --------------------------------------------------------  IN: 0 mL / OUT: 250 mL / NET: -250 mL    25 Aug 2023 07:01  -  25 Aug 2023 15:50  --------------------------------------------------------  IN: 0 mL / OUT: 400 mL / NET: -400 mL        PHYSICAL EXAM:  Vital Signs Last 24 Hrs  T(C): 36.7 (25 Aug 2023 11:34), Max: 37.2 (24 Aug 2023 18:26)  T(F): 98.1 (25 Aug 2023 11:34), Max: 98.9 (24 Aug 2023 18:26)  HR: 68 (25 Aug 2023 11:34) (68 - 86)  BP: 116/64 (25 Aug 2023 11:34) (108/64 - 124/85)  BP(mean): 80 (25 Aug 2023 05:20) (80 - 91)  RR: 18 (25 Aug 2023 11:34) (18 - 19)  SpO2: 92% (25 Aug 2023 11:34) (92% - 95%)    Parameters below as of 25 Aug 2023 11:34  Patient On (Oxygen Delivery Method): room air        PHYSICAL EXAM:  GENERAL: NAD, lying in bed comfortably  HEENT: NC/AT  NECK: Supple, No JVD  CHEST/LUNG: CTAB, no increased WOB  HEART: RRR, no m/r/g  ABDOMEN: soft, NT, ND, BS+  EXTREMITIES:  2+ peripheral pulses, no edema  NERVOUS SYSTEM:  A&Ox3  MSK: FROM all 4 extremities, full and equal strength  SKIN: No rashes or lesions    LABS:                        9.8    10.20 )-----------( 279      ( 25 Aug 2023 06:58 )             31.7     08-25    147<H>  |  109<H>  |  13  ----------------------------<  98  4.5   |  26  |  0.58    Ca    8.9      25 Aug 2023 06:58  Phos  3.1     08-25  Mg     2.1     08-25    TPro  6.3  /  Alb  3.2<L>  /  TBili  0.6  /  DBili  x   /  AST  9<L>  /  ALT  6<L>  /  AlkPhos  49  08-25    PT/INR - ( 24 Aug 2023 14:26 )   PT: 14.2 sec;   INR: 1.36 ratio         PTT - ( 24 Aug 2023 14:26 )  PTT:34.5 sec  CARDIAC MARKERS ( 25 Aug 2023 06:58 )  x     / x     / 12 U/L / x     / x          Urinalysis Basic - ( 25 Aug 2023 06:58 )    Color: x / Appearance: x / SG: x / pH: x  Gluc: 98 mg/dL / Ketone: x  / Bili: x / Urobili: x   Blood: x / Protein: x / Nitrite: x   Leuk Esterase: x / RBC: x / WBC x   Sq Epi: x / Non Sq Epi: x / Bacteria: x

## 2023-08-25 NOTE — SWALLOW BEDSIDE ASSESSMENT ADULT - ORAL PHASE
Prolonged oral transit time. Prolonged oral transit time with no residue in the oral cavity s/p initial swallow.

## 2023-08-25 NOTE — SWALLOW BEDSIDE ASSESSMENT ADULT - COMMENTS
CT Abdomen/Pelvis: Urinary bladder wall thickening and inflammatory change, suspicious for cystitis. Correlate with urinalysis.  Bilateral renal cortical scarring. No hydronephrosis.  A moderate right pleural effusion. Bilateral calcified pleural plaques.

## 2023-08-25 NOTE — PHYSICAL THERAPY INITIAL EVALUATION ADULT - PLANNED THERAPY INTERVENTIONS, PT EVAL
stair training- GOAL: 1 flight with rail and min assist, 4 wks/balance training/bed mobility training/gait training/transfer training

## 2023-08-25 NOTE — PATIENT PROFILE ADULT - NSPROSPHOSPCHAPLAINYN_GEN_A_NUR
PROCEDURE:  CT Abdomen and Pelvis with contrast



HISTORY:

Right lower quadrant abdominal pain 



COMPARISON:

Ultrasound dated 01/05/2016



TECHNIQUE:

Multiple contiguous axial images were performed through the abdomen 

and pelvis with the use of intravenous contrast. Subsequently, 

sagittal and coronal reformatted images were obtained.



Radiation dose:



Total exam DLP = 736 mGy-cm.



This CT exam was performed using one or more of the following dose 

reduction techniques: Automated exposure control, adjustment of the 

mA and/or kV according to patient size, and/or use of iterative 

reconstruction technique.



FINDINGS:



LOWER THORAX:

Coarse reticular pleural-based densities noted in the right middle 

lobe. 



LIVER:

Surgical clip adjacent to the posterior margin of the liver. Mild 

intrahepatic biliary ductal dilatation.  Few additional punctate 

scattered hypodensities in the liver, too small to adequately 

characterize. Small 7 millimeter hypodensity at the dome of the liver,

 too small to adequately characterize. 



GALLBLADDER AND BILE DUCTS:

Surgical clips are noted in the gallbladder fossa. 



PANCREAS:

Unremarkable. No gross lesion or ductal dilatation.



SPLEEN:

Unremarkable. 



ADRENALS:

Unremarkable. No mass. 



KIDNEYS AND URETERS:

Unremarkable. No hydronephrosis. No solid mass. 



VASCULATURE:

Unremarkable. No aortic aneurysm. 



BOWEL:

Unremarkable. No obstruction. No gross mural thickening. 



APPENDIX:

Mild thickening of the tip of the appendix with mild periappendiceal 

inflammatory changes. 



PERITONEUM:

Unremarkable. No free fluid. No free air. 



LYMPH NODES:

Unremarkable. No enlarged lymph nodes. 



BLADDER:

Unremarkable. 



REPRODUCTIVE:

Prostate is prominent measuring 4.1 x 4.7 x 4.4 centimeters. 



BONES:

Degenerative changes noted in the lumbar spine. Retrolisthesis of L5 

on S1. 



OTHER FINDINGS:

None.



IMPRESSION:

Mild thickening of the tip of the appendix with mild periappendiceal 

inflammatory changes.  These findings are concerning for possible 

acute appendicitis.  Clinical correlation.



Additional findings as above.



These findings were preliminarily reported at 9:37 p.m. on 05/23/2017 

by Dr. Trixie Juárez from [employer].
no

## 2023-08-25 NOTE — PROGRESS NOTE ADULT - PROBLEM SELECTOR PLAN 2
Patient sats at 92% on RA on admission    - poss from pleural plaques vs  parapneumonic effusion vs other  - informed pt's daughter that if pt becomes more symptomatic respiratory wise, can get thoracentesis  - hold off workup for malignancy given pt's age and comorbidities, which pt's daughter is in agreement with Patient sats at 92% on RA on admission    - If pt becomes more symptomatic respiratory wise, can diurese and/or get thoracentesis  - hold off workup for malignancy given pt's age and comorbidities, which pt's daughter is in agreement with

## 2023-08-25 NOTE — SWALLOW BEDSIDE ASSESSMENT ADULT - SLP GENERAL OBSERVATIONS
Patient received seated upright in bed, on room air, A&Ox1 (baseline), with caregiver at bedside. Patient benefited from frequent repetition of directions and review of evaluation. Caregiver provided verbal cues which improved patient participation.

## 2023-08-25 NOTE — SWALLOW BEDSIDE ASSESSMENT ADULT - SWALLOW EVAL: DIAGNOSIS
89M Dementia (A&Ox1 baseline), Afib on Xarelto, PPM placement, pleural plaques, aspiration pna in past, chronic cough, chronic mild leg swelling, pw gross hematuria. Bedside swallow evaluation completed. Pt presents with clinical signs of an tay-pharyngeal dysphagia. Oral phase marked by inconsistent orientation to spoon when self-feeding, prolonged bolus manipulation 2/2 incomplete dentition, and prolonged oral transit time. Pharyngeal phase marked by reduced hyo-laryngeal elevation and spontaneous production of repeat swallow suggestive of pharyngeal residue. No overt s/s of aspiration/penetration or changes in vocal quality noted on consistencies trialed. Additional textures not administered due to concerns surrounding mastication efficiency 2/2 incomplete dentition and no dentures. Recommended dysphagia diet, aspiration risks/precautions, and safe swallow guidelines reviewed with caregiver at bedside. Caregiver demonstrates good understanding of education provided.

## 2023-08-25 NOTE — SWALLOW BEDSIDE ASSESSMENT ADULT - ORAL PREPARATORY PHASE
Intermittent difficulty orienting to spoon with slow but adequate manipulation of the bolus. Intermittent difficulty orienting to spoon.

## 2023-08-26 ENCOUNTER — TRANSCRIPTION ENCOUNTER (OUTPATIENT)
Age: 88
End: 2023-08-26

## 2023-08-26 LAB
ANION GAP SERPL CALC-SCNC: 11 MMOL/L — SIGNIFICANT CHANGE UP (ref 5–17)
BUN SERPL-MCNC: 12 MG/DL — SIGNIFICANT CHANGE UP (ref 7–23)
CALCIUM SERPL-MCNC: 8.8 MG/DL — SIGNIFICANT CHANGE UP (ref 8.4–10.5)
CHLORIDE SERPL-SCNC: 106 MMOL/L — SIGNIFICANT CHANGE UP (ref 96–108)
CO2 SERPL-SCNC: 26 MMOL/L — SIGNIFICANT CHANGE UP (ref 22–31)
CREAT SERPL-MCNC: 0.56 MG/DL — SIGNIFICANT CHANGE UP (ref 0.5–1.3)
EGFR: 94 ML/MIN/1.73M2 — SIGNIFICANT CHANGE UP
GLUCOSE SERPL-MCNC: 94 MG/DL — SIGNIFICANT CHANGE UP (ref 70–99)
HCT VFR BLD CALC: 32 % — LOW (ref 39–50)
HGB BLD-MCNC: 9.8 G/DL — LOW (ref 13–17)
MAGNESIUM SERPL-MCNC: 2 MG/DL — SIGNIFICANT CHANGE UP (ref 1.6–2.6)
MCHC RBC-ENTMCNC: 26.6 PG — LOW (ref 27–34)
MCHC RBC-ENTMCNC: 30.6 GM/DL — LOW (ref 32–36)
MCV RBC AUTO: 87 FL — SIGNIFICANT CHANGE UP (ref 80–100)
MRSA PCR RESULT.: SIGNIFICANT CHANGE UP
NRBC # BLD: 0 /100 WBCS — SIGNIFICANT CHANGE UP (ref 0–0)
PHOSPHATE SERPL-MCNC: 2.7 MG/DL — SIGNIFICANT CHANGE UP (ref 2.5–4.5)
PLATELET # BLD AUTO: 301 K/UL — SIGNIFICANT CHANGE UP (ref 150–400)
POTASSIUM SERPL-MCNC: 3.8 MMOL/L — SIGNIFICANT CHANGE UP (ref 3.5–5.3)
POTASSIUM SERPL-SCNC: 3.8 MMOL/L — SIGNIFICANT CHANGE UP (ref 3.5–5.3)
RBC # BLD: 3.68 M/UL — LOW (ref 4.2–5.8)
RBC # FLD: 15.5 % — HIGH (ref 10.3–14.5)
S AUREUS DNA NOSE QL NAA+PROBE: SIGNIFICANT CHANGE UP
SODIUM SERPL-SCNC: 143 MMOL/L — SIGNIFICANT CHANGE UP (ref 135–145)
TSH SERPL-MCNC: 6.06 UIU/ML — HIGH (ref 0.27–4.2)
WBC # BLD: 11.16 K/UL — HIGH (ref 3.8–10.5)
WBC # FLD AUTO: 11.16 K/UL — HIGH (ref 3.8–10.5)

## 2023-08-26 PROCEDURE — 99233 SBSQ HOSP IP/OBS HIGH 50: CPT | Mod: GC

## 2023-08-26 PROCEDURE — 71045 X-RAY EXAM CHEST 1 VIEW: CPT | Mod: 26

## 2023-08-26 RX ADMIN — Medication 1 DROP(S): at 17:52

## 2023-08-26 RX ADMIN — SENNA PLUS 2 TABLET(S): 8.6 TABLET ORAL at 21:30

## 2023-08-26 RX ADMIN — FAMOTIDINE 20 MILLIGRAM(S): 10 INJECTION INTRAVENOUS at 11:48

## 2023-08-26 RX ADMIN — POLYETHYLENE GLYCOL 3350 17 GRAM(S): 17 POWDER, FOR SOLUTION ORAL at 11:47

## 2023-08-26 RX ADMIN — CEFTRIAXONE 100 MILLIGRAM(S): 500 INJECTION, POWDER, FOR SOLUTION INTRAMUSCULAR; INTRAVENOUS at 05:26

## 2023-08-26 RX ADMIN — Medication 1 DROP(S): at 21:30

## 2023-08-26 RX ADMIN — Medication 1 DROP(S): at 05:27

## 2023-08-26 RX ADMIN — CHLORHEXIDINE GLUCONATE 1 APPLICATION(S): 213 SOLUTION TOPICAL at 11:48

## 2023-08-26 NOTE — PROGRESS NOTE ADULT - PROBLEM SELECTOR PLAN 2
Patient sats at 92% on RA on admission    - If pt becomes more symptomatic respiratory wise, can diurese and/or get thoracentesis  - hold off workup for malignancy given pt's age and comorbidities, which pt's daughter is in agreement with

## 2023-08-26 NOTE — DISCHARGE NOTE PROVIDER - PROVIDER TOKENS
PROVIDER:[TOKEN:[[id number]:[facility]:[id number]],FOLLOWUP:[2 weeks],ESTABLISHEDPATIENT:[T]]

## 2023-08-26 NOTE — DISCHARGE NOTE PROVIDER - NSDCFUADDAPPT_GEN_ALL_CORE_FT
Please follow up with your primary care doctor listed above.  Please HOLD Xarelto and lasix until follow up with your doctor.  Please follow up with your primary care doctor listed above. Please also follow-up with Urology at the attached address.   Please HOLD Xarelto and lasix until follow up with your doctor.

## 2023-08-26 NOTE — DISCHARGE NOTE PROVIDER - HOSPITAL COURSE
89M dementia A&Ox1 baseline, Afib on xarelto, PPM placement, pleural plaques, aspiration pna in past, chronic cough, chronic mild leg swelling, pw gross hematuria, uti, sepsis. UA was grossly positive on admission. Imaging showed a R sided pleural effusion. Patient was hemodynamically stable and saturating well on RA. Patient was started on CTX and ISO hemorrhagic cystitis xarelto was held. Urology was consulted for possible need for CBI and decided NTD. Patient was continued on CTX initial UCx resulted in >100k gram negative rods with final result of _____.     Patient continued to remain with VSS and no acute changes, continued to improve with abx and was discharged to complete 5 day course. 89M dementia A&Ox1 baseline, Afib on xarelto, PPM placement, pleural plaques, aspiration pna in past, chronic cough, chronic mild leg swelling, pw gross hematuria, uti, sepsis. UA was grossly positive on admission. Imaging showed a R sided pleural effusion. Patient was hemodynamically stable and saturating well on RA. Patient was started on CTX and ISO hemorrhagic cystitis xarelto was held. Urology was consulted for possible need for CBI and decided NTD. Patient was continued on CTX initial UCx resulted in >100k gram negative rods with final result of Proteus Mirabelus ESBL. Abx were changed from CTX to Ertapenem and ID was c/s. Midline was placed patient was safely discharged to complete abx at home.     Patient continued to remain with VSS and no acute changes, continued to improve with abx and was discharged to complete 5 day course. 89M dementia A&Ox1 baseline, Afib on xarelto, PPM placement, pleural plaques, aspiration pna in past, chronic cough, chronic mild leg swelling, pw gross hematuria, uti, sepsis. UA was grossly positive on admission. Imaging showed a R sided pleural effusion. Patient was hemodynamically stable and saturating well on RA. Patient was started on CTX and ISO hemorrhagic cystitis xarelto was held. Urology was consulted for possible need for CBI and decided NTD. Patient was continued on CTX initial UCx resulted in >100k gram negative rods with final result of Proteus Mirabelus ESBL. Abx were changed from CTX to Ertapenem and ID was c/s. ID recommended finishing a 3 day course which was completed and the patient was medically cleared to be dishcarged home.    On day of discharge, patient is clinically stable with no new exam findings or acute symptoms compared to prior. The patient was seen by the attending physician on the date of discharge and deemed stable and acceptable for discharge. The patient's chronic medical conditions were treated accordingly per the patient's home medication regimen. The patient's medication reconciliation (with changes made to chronic medications), follow up appointments, discharge orders, instructions, and significant lab and diagnostic studies are as noted.    Patient will be discharged to home with close follow up.   89M dementia A&Ox1 baseline, Afib on xarelto, PPM placement, pleural plaques, aspiration pna in past, chronic cough, chronic mild leg swelling, pw gross hematuria, uti, sepsis. UA was grossly positive with blood on admission. Imaging showed a R sided pleural effusion. Patient was hemodynamically stable and saturating well on RA. Patient was started on CTX and ISO hemorrhagic cystitis xarelto was held. Urology was consulted for possible need for CBI and decided NTD. Patient was continued on CTX initial UCx resulted in >100k gram negative rods with final result of Proteus Mirabelus ESBL. Abx were changed from CTX to Ertapenem and ID was c/s. ID recommended finishing a 3 day course which was completed and the patient was medically cleared to be discharged home.     On day of discharge, patient is clinically stable with no new exam findings or acute symptoms compared to prior. The patient was seen by the attending physician on the date of discharge and deemed stable and acceptable for discharge. The patient's chronic medical conditions were treated accordingly per the patient's home medication regimen. The patient's medication reconciliation (with changes made to chronic medications), follow up appointments, discharge orders, instructions, and significant lab and diagnostic studies are as noted.    Patient will be discharged to home with close follow up.    Follow up:    Restart Xarelto for Afib when able    89M dementia A&Ox1 baseline, Afib on xarelto, PPM placement, pleural plaques, aspiration pna in past, chronic cough, chronic mild leg swelling, pw gross hematuria, uti, sepsis. UA was grossly positive with blood on admission. Imaging showed a R sided pleural effusion. Patient was hemodynamically stable and saturating well on RA. Patient was started on CTX and ISO hemorrhagic cystitis xarelto was held. Urology was consulted for possible need for CBI and decided NTD. Patient was continued on CTX initial UCx resulted in >100k gram negative rods with final result of Proteus Mirabelus ESBL. Abx were changed from CTX to Ertapenem and ID was c/s. ID recommended finishing a 3 day course which was completed and the patient was medically cleared to be discharged home.     On day of discharge, patient is clinically stable with no new exam findings or acute symptoms compared to prior. The patient was seen by the attending physician on the date of discharge and deemed stable and acceptable for discharge. The patient's chronic medical conditions were treated accordingly per the patient's home medication regimen. The patient's medication reconciliation (with changes made to chronic medications), follow up appointments, discharge orders, instructions, and significant lab and diagnostic studies are as noted.    Patient will be discharged to home with close follow up.    Follow up:    Restart Xarelto for Afib outpatient    89M dementia A&Ox1 baseline, Afib on xarelto, PPM placement, pleural plaques, aspiration pna in past, chronic cough, chronic mild leg swelling, pw gross hematuria, uti, sepsis. UA was grossly positive with blood on admission. Imaging showed a R sided pleural effusion. Patient was hemodynamically stable and saturating well on RA. Patient was started on CTX and ISO hemorrhagic cystitis xarelto was held. Urology was consulted for possible need for CBI and decided NTD. Patient was continued on CTX initial UCx resulted in >100k gram negative rods with final result of Proteus Mirabelus ESBL. Abx were changed from CTX to Ertapenem and ID was c/s. ID recommended finishing a 3 day course which was completed and the patient was medically cleared to be discharged home.     On day of discharge, patient is clinically stable with no new exam findings or acute symptoms compared to prior. The patient was seen by the attending physician on the date of discharge and deemed stable and acceptable for discharge. The patient's chronic medical conditions were treated accordingly per the patient's home medication regimen. The patient's medication reconciliation (with changes made to chronic medications), follow up appointments, discharge orders, instructions, and significant lab and diagnostic studies are as noted.    Patient will be discharged to home with close follow up.    Follow up:    Restart Xarelto for Afib outpatient   Restart lasix when blood pressure tolerates   89M dementia A&Ox1 baseline, Afib on xarelto, PPM placement, pleural plaques, aspiration pna in past, chronic cough, chronic mild leg swelling, pw gross hematuria, uti, sepsis. UA was grossly positive with blood on admission. Imaging showed a R sided pleural effusion. Patient was hemodynamically stable and saturating well on RA. Patient was started on CTX and ISO hemorrhagic cystitis xarelto was held. Urology was consulted for possible need for CBI and decided NTD. Patient was continued on CTX initial UCx resulted in >100k gram negative rods with final result of Proteus Mirabelus ESBL. Abx were changed from CTX to Ertapenem and ID was c/s. ID recommended finishing a 3 day course which was completed and the patient was medically cleared to be discharged home.     On day of discharge, patient is clinically stable with no new exam findings or acute symptoms compared to prior. The patient was seen by the attending physician on the date of discharge and deemed stable and acceptable for discharge. The patient's chronic medical conditions were treated accordingly per the patient's home medication regimen. The patient's medication reconciliation (with changes made to chronic medications), follow up appointments, discharge orders, instructions, and significant lab and diagnostic studies are as noted.    Patient will be discharged to home with close follow up with PCP and Urology.     Follow up:  Restart Xarelto for Afib outpatient   Restart lasix when blood pressure tolerates

## 2023-08-26 NOTE — DISCHARGE NOTE PROVIDER - NSDCMRMEDTOKEN_GEN_ALL_CORE_FT
cycloSPORINE 0.05% ophthalmic emulsion: 1 drop(s) in each affected eye  Lasix 20 mg oral tablet: 1 tab(s) orally once a day  Macrobid 100 mg oral capsule: 1 cap(s) orally  Pepcid 20 mg oral tablet: 1 tab(s) orally once a day  potassium chloride 20 mEq oral tablet, extended release: 1 tab(s) orally once a day  Xarelto 15 mg oral tablet: 1 tab(s) orally once a day   3:1 Commode  R53.1: 3:1 Commode  cycloSPORINE 0.05% ophthalmic emulsion: 1 drop(s) in each affected eye  Hospital bed R53.1: Hospital bed  Lasix 20 mg oral tablet: 1 tab(s) orally once a day  Macrobid 100 mg oral capsule: 1 cap(s) orally  Mechanical Mikayla Lift: Please use while transporting patient to bed  Pepcid 20 mg oral tablet: 1 tab(s) orally once a day  potassium chloride 20 mEq oral tablet, extended release: 1 tab(s) orally once a day  Xarelto 15 mg oral tablet: 1 tab(s) orally once a day   3:1 Commode  R53.1: 3:1 Commode  cycloSPORINE 0.05% ophthalmic emulsion: 1 drop(s) in each affected eye  Gel overlay: Please use mattress with hospital bed  Hospital bed R53.1: Hospital bed  Lasix 20 mg oral tablet: 1 tab(s) orally once a day  Macrobid 100 mg oral capsule: 1 cap(s) orally  Mechanical Mikayla Lift: Please use while transporting patient to bed  Pepcid 20 mg oral tablet: 1 tab(s) orally once a day  potassium chloride 20 mEq oral tablet, extended release: 1 tab(s) orally once a day  Xarelto 15 mg oral tablet: 1 tab(s) orally once a day   cycloSPORINE 0.05% ophthalmic emulsion: 1 drop(s) in each affected eye  Pepcid 20 mg oral tablet: 1 tab(s) orally once a day   cycloSPORINE 0.05% ophthalmic emulsion: 1 drop(s) in each affected eye once a day Please take this medication as you are taking it before your admission to the hospital.  Pepcid 20 mg oral tablet: 1 tab(s) orally once a day

## 2023-08-26 NOTE — DISCHARGE NOTE PROVIDER - CARE PROVIDER_API CALL
Desirae Sharma  Internal Medicine  [address] [facility] [place] [address]  Phone: [phone]  Fax: [phone]  Established Patient  Follow Up Time: 2 weeks

## 2023-08-26 NOTE — PROGRESS NOTE ADULT - SUBJECTIVE AND OBJECTIVE BOX
************************  Johnathon Montenegro MD  Internal Medicine PGY-1  ************************    Patient is a 89y old  Male who presents with a chief complaint of gross hematuria (26 Aug 2023 07:50)    Overnight no events. Patient AOx0, unable to complete ROS.    MEDICATIONS  (STANDING):  artificial tears (preservative free) Ophthalmic Solution 1 Drop(s) Both EYES three times a day  cefTRIAXone   IVPB 1000 milliGRAM(s) IV Intermittent every 24 hours  chlorhexidine 4% Liquid 1 Application(s) Topical daily  famotidine    Tablet 20 milliGRAM(s) Oral daily  polyethylene glycol 3350 17 Gram(s) Oral daily  senna 2 Tablet(s) Oral at bedtime    MEDICATIONS  (PRN):      CAPILLARY BLOOD GLUCOSE        I&O's Summary    25 Aug 2023 07:01  -  26 Aug 2023 07:00  --------------------------------------------------------  IN: 150 mL / OUT: 700 mL / NET: -550 mL        PHYSICAL EXAM:  Vital Signs Last 24 Hrs  T(C): 36.8 (26 Aug 2023 05:17), Max: 36.8 (26 Aug 2023 05:17)  T(F): 98.3 (26 Aug 2023 05:17), Max: 98.3 (26 Aug 2023 05:17)  HR: 82 (26 Aug 2023 05:17) (68 - 82)  BP: 105/60 (26 Aug 2023 05:17) (105/60 - 116/64)  BP(mean): --  RR: 18 (26 Aug 2023 05:17) (18 - 18)  SpO2: 93% (26 Aug 2023 05:17) (92% - 96%)    Parameters below as of 26 Aug 2023 05:17  Patient On (Oxygen Delivery Method): room air        PHYSICAL EXAM:  GENERAL: NAD, lying in bed comfortably  HEENT: NC/AT  NECK: Supple, No JVD  CHEST/LUNG: CTAB, mild WOB, saturating well  HEART: RRR, no m/r/g  ABDOMEN: soft, NT, ND, BS+  EXTREMITIES:  2+ peripheral pulses, no edema  NERVOUS SYSTEM:  A&OxO  SKIN: No rashes or lesions    LABS:                        9.8    11.16 )-----------( 301      ( 26 Aug 2023 07:19 )             32.0     08-26    143  |  106  |  12  ----------------------------<  94  3.8   |  26  |  0.56    Ca    8.8      26 Aug 2023 07:20  Phos  2.7     08-26  Mg     2.0     08-26    TPro  6.3  /  Alb  3.2<L>  /  TBili  0.6  /  DBili  x   /  AST  9<L>  /  ALT  6<L>  /  AlkPhos  49  08-25    PT/INR - ( 24 Aug 2023 14:26 )   PT: 14.2 sec;   INR: 1.36 ratio         PTT - ( 24 Aug 2023 14:26 )  PTT:34.5 sec  CARDIAC MARKERS ( 25 Aug 2023 06:58 )  x     / x     / 12 U/L / x     / x          Urinalysis Basic - ( 26 Aug 2023 07:20 )    Color: x / Appearance: x / SG: x / pH: x  Gluc: 94 mg/dL / Ketone: x  / Bili: x / Urobili: x   Blood: x / Protein: x / Nitrite: x   Leuk Esterase: x / RBC: x / WBC x   Sq Epi: x / Non Sq Epi: x / Bacteria: x

## 2023-08-26 NOTE — DISCHARGE NOTE PROVIDER - NSDCCPTREATMENT_GEN_ALL_CORE_FT
PRINCIPAL PROCEDURE  Procedure: CT abdomen  Findings and Treatment: LOWER CHEST:Partially imaged cardiac devic  < end of copied text >  e leads. Cardiomegaly with   aortic root and coronary artery calcifications. Calcified pleural   plaques. A moderate right pleural effusion with adjacent compressive   atelectasis. Additional bibasilar subsegmental atelectasis.  LIVER: Within normal limits.  BILE DUCTS: Normal caliber.  GALLBLADDER: Cholelithiasis.  SPLEEN: Within normal limits.  PANCREAS: Within normal limits.  ADRENALS: Within normal limits.  KIDNEYS/URETERS: Bilateral cortical scarring. Subcentimeter hypodensities   bilaterally that are too small to characterize. No hydronephrosis.  BLADDER: Circumferential wall thickening with mild perivesicular   inflammatory change.  REPRODUCTIVE ORGANS: Normal size prostate.  BOWEL: No bowel obstruction. Appendix is normal. 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 Small stool burden in the rectal vault.  PERITONEUM: No ascites.  VESSELS: Atherosclerotic changes. Moderate stenosis of the proximal SMA,   which remains patent.  RETROPERITONEUM/LYMPH NODES: No lymphadenopathy.  ABDOMINAL WALL: Fatty atrophy of the paraspinal musculature.  BONES: Degenerative changes.  IMPRESSION:  Urinary bladder wall thickening and inflammatory change, suspicious for   cystitis. Correlate with urinalysis.  Bilateral renal cortical scarring. No hydronephrosis.  A moderate right pleural effusion. Bilateral calcified pleural plaques.< from: CT Abdomen and Pelvis w/ IV Cont (08.24.23 @ 17:57) >

## 2023-08-26 NOTE — PROGRESS NOTE ADULT - PROBLEM SELECTOR PLAN 4
Likely 2/2 uti    - monitor for hypothermia, trend wbc  - blood cultures ordered as pt c/o flank pain  - CT abd/pelvis IMPRESSION:  Urinary bladder wall thickening and inflammatory change, suspicious for   cystitis. Correlate with urinalysis.  Bilateral renal cortical scarring. No hydronephrosis.  A moderate right pleural effusion. Bilateral calcified pleural plaques.

## 2023-08-26 NOTE — DISCHARGE NOTE PROVIDER - ATTENDING DISCHARGE PHYSICAL EXAMINATION:
VITAL SIGNS:  T(C): 36.4 (08-30-23 @ 05:51), Max: 36.9 (08-29-23 @ 13:48)  T(F): 97.5 (08-30-23 @ 05:51), Max: 98.4 (08-29-23 @ 13:48)  HR: 79 (08-30-23 @ 05:51) (79 - 103)  BP: 110/63 (08-30-23 @ 05:51) (103/59 - 119/76)  RR: 18 (08-30-23 @ 05:51) (18 - 18)  SpO2: 96% (08-30-23 @ 05:51) (93% - 96%)    PHYSICAL EXAM:  Constitutional: NAD, frail, elderly   Head: NC/AT  Eyes: PERRL, EOMI, anicteric sclera  ENT: no nasal discharge; uvula midline, no oropharyngeal erythema or exudates; MMM  Neck: supple; no JVD or thyromegaly  Respiratory: CTA B/L; no W/R/R, no retractions  Cardiac: +S1/S2; RRR; no M/R/G; PMI non-displaced  Gastrointestinal: abdomen soft, NT/ND; no rebound or guarding; +BSx4  Back: spine midline, no bony tenderness or step-offs; no CVAT B/L  Extremities: WWP, no clubbing or cyanosis; no peripheral edema  Musculoskeletal: NROM x4; no joint swelling, tenderness or erythema  Vascular: 2+ radial, femoral, DP/PT pulses B/L  Dermatologic: skin warm, dry and intact; no rashes, wounds, or scars  Lymphatic: no submandibular or cervical LAD  Neurologic: AAOx1 (person only)  Psychiatric: Unable to assess

## 2023-08-26 NOTE — DISCHARGE NOTE PROVIDER - NSFOLLOWUPCLINICS_GEN_ALL_ED_FT
ROJELIO [facility]  Urology  [address] [place] [address], [place] [address]  Phone: [phone]  Fax:

## 2023-08-26 NOTE — DISCHARGE NOTE PROVIDER - NSDCCPCAREPLAN_GEN_ALL_CORE_FT
PRINCIPAL DISCHARGE DIAGNOSIS  Diagnosis: Gross hematuria  Assessment and Plan of Treatment: You came to the hospital with     PRINCIPAL DISCHARGE DIAGNOSIS  Diagnosis: Gross hematuria  Assessment and Plan of Treatment: You came to the hospital with blood in your urine. You were also found to have a urinary tract infection. You were seen by the urology team for the bleeding and they would like you to follow outpatient for treatment.  For your urinary tract infection you recieved antibiotics.  Please follow up with your primary care doctor.   If you start to experience symptoms including but not limited to: burning with urination, fever, chills, confusion, please return to the emergency room.     PRINCIPAL DISCHARGE DIAGNOSIS  Diagnosis: Gross hematuria  Assessment and Plan of Treatment: You came to the hospital with blood in your urine. You were also found to have a urinary tract infection. You were seen by the urology team for the bleeding and they would like you to follow outpatient for treatment.  For your urinary tract infection you recieved antibiotics.  Please follow up with your primary care doctor.   If you start to experience symptoms including but not limited to: burning with urination, fever, chills, confusion, please return to the emergency room.      SECONDARY DISCHARGE DIAGNOSES  Diagnosis: Acute UTI  Assessment and Plan of Treatment: You came to the hospital with a urinary tract infection. In order to treat this you needed IV antibiotics due to the type of bacteria that grew. A line was placed in order for you to be able to complete the full course antibiotics at home.  Please follow up with your primary care doctor.   If you start to experience symptoms including but not limited to: fever chills, confusion, nausea, burning with urination, please return to the emergency room.     PRINCIPAL DISCHARGE DIAGNOSIS  Diagnosis: Gross hematuria  Assessment and Plan of Treatment: You came to the hospital with blood in your urine. You were also found to have a urinary tract infection. You were seen by the urology team for the bleeding and they would like you to follow outpatient for treatment.  For your urinary tract infection you recieved antibiotics. You also had gross blood in the urine and your Xarelto for Atrial fibrillation was held. Please continue to hold this medication until follow up with your doctor.   If you start to experience symptoms including but not limited to: burning with urination, fever, chills, confusion, please return to the emergency room.      SECONDARY DISCHARGE DIAGNOSES  Diagnosis: Acute UTI  Assessment and Plan of Treatment: You came to the hospital with a urinary tract infection. In order to treat this you needed IV antibiotics due to the type of bacteria that grew. You received IV antibiotics and completed a course in the hospital. You did not require antibiotics at home. Please hold your home lasix until follow up with your primary care doctor. Please give patient daily laxatives if constipated as that can contribute to infections.   If you start to experience symptoms including but not limited to: fever chills, confusion, nausea, burning with urination, please return to the emergency room.     PRINCIPAL DISCHARGE DIAGNOSIS  Diagnosis: Gross hematuria  Assessment and Plan of Treatment: You came to the hospital with blood in your urine. You were also found to have a urinary tract infection. You were seen by the urology team for the bleeding and they would like you to follow outpatient for treatment.  For your urinary tract infection you recieved antibiotics. You also had gross blood in the urine and your Xarelto for Atrial fibrillation was held. Please continue to hold this medication until follow up with your primary care doctor. Please follow-up with your primary care doctor and with the Urologists at the [facility].   If you start to experience symptoms including but not limited to: burning with urination, fever, chills, confusion, please return to the emergency room.      SECONDARY DISCHARGE DIAGNOSES  Diagnosis: Acute UTI  Assessment and Plan of Treatment: You came to the hospital with a urinary tract infection. In order to treat this you needed IV antibiotics due to the type of bacteria that grew. You received IV antibiotics and completed a course in the hospital. You did not require antibiotics at home. Please hold your home lasix until follow up with your primary care doctor. Please give patient daily laxatives if constipated as that can contribute to infections.   If you start to experience symptoms including but not limited to: fever chills, confusion, nausea, burning with urination, please return to the emergency room.

## 2023-08-26 NOTE — PROGRESS NOTE ADULT - PROBLEM SELECTOR PLAN 1
Likely 2/2 hemorrhagic cystitis    - hold xarelto  - monitor hgb  - trend cr  - c/w IVF  - TOV  - Urology c/s for CBI Likely 2/2 hemorrhagic cystitis    - hold xarelto  - monitor hgb  - trend cr  - TOV tonight  - Urology c/s -> NTD from their end inpatient

## 2023-08-27 LAB
-  AMIKACIN: SIGNIFICANT CHANGE UP
-  AMOXICILLIN/CLAVULANIC ACID: SIGNIFICANT CHANGE UP
-  AMPICILLIN/SULBACTAM: SIGNIFICANT CHANGE UP
-  AMPICILLIN: SIGNIFICANT CHANGE UP
-  AZTREONAM: SIGNIFICANT CHANGE UP
-  CEFAZOLIN: SIGNIFICANT CHANGE UP
-  CEFEPIME: SIGNIFICANT CHANGE UP
-  CEFTRIAXONE: SIGNIFICANT CHANGE UP
-  CEFUROXIME: SIGNIFICANT CHANGE UP
-  CIPROFLOXACIN: SIGNIFICANT CHANGE UP
-  ERTAPENEM: SIGNIFICANT CHANGE UP
-  GENTAMICIN: SIGNIFICANT CHANGE UP
-  LEVOFLOXACIN: SIGNIFICANT CHANGE UP
-  MEROPENEM: SIGNIFICANT CHANGE UP
-  NITROFURANTOIN: SIGNIFICANT CHANGE UP
-  PIPERACILLIN/TAZOBACTAM: SIGNIFICANT CHANGE UP
-  TOBRAMYCIN: SIGNIFICANT CHANGE UP
-  TRIMETHOPRIM/SULFAMETHOXAZOLE: SIGNIFICANT CHANGE UP
ANION GAP SERPL CALC-SCNC: 12 MMOL/L — SIGNIFICANT CHANGE UP (ref 5–17)
BASOPHILS # BLD AUTO: 0 K/UL — SIGNIFICANT CHANGE UP (ref 0–0.2)
BASOPHILS NFR BLD AUTO: 0 % — SIGNIFICANT CHANGE UP (ref 0–2)
BUN SERPL-MCNC: 11 MG/DL — SIGNIFICANT CHANGE UP (ref 7–23)
BURR CELLS BLD QL SMEAR: PRESENT — SIGNIFICANT CHANGE UP
CALCIUM SERPL-MCNC: 8.8 MG/DL — SIGNIFICANT CHANGE UP (ref 8.4–10.5)
CHLORIDE SERPL-SCNC: 107 MMOL/L — SIGNIFICANT CHANGE UP (ref 96–108)
CO2 SERPL-SCNC: 24 MMOL/L — SIGNIFICANT CHANGE UP (ref 22–31)
CREAT SERPL-MCNC: 0.58 MG/DL — SIGNIFICANT CHANGE UP (ref 0.5–1.3)
CULTURE RESULTS: SIGNIFICANT CHANGE UP
DACRYOCYTES BLD QL SMEAR: SLIGHT — SIGNIFICANT CHANGE UP
EGFR: 93 ML/MIN/1.73M2 — SIGNIFICANT CHANGE UP
ELLIPTOCYTES BLD QL SMEAR: SLIGHT — SIGNIFICANT CHANGE UP
EOSINOPHIL # BLD AUTO: 0.41 K/UL — SIGNIFICANT CHANGE UP (ref 0–0.5)
EOSINOPHIL NFR BLD AUTO: 3.5 % — SIGNIFICANT CHANGE UP (ref 0–6)
GLUCOSE SERPL-MCNC: 90 MG/DL — SIGNIFICANT CHANGE UP (ref 70–99)
HCT VFR BLD CALC: 33.3 % — LOW (ref 39–50)
HGB BLD-MCNC: 9.8 G/DL — LOW (ref 13–17)
LYMPHOCYTES # BLD AUTO: 0.82 K/UL — LOW (ref 1–3.3)
LYMPHOCYTES # BLD AUTO: 7 % — LOW (ref 13–44)
MAGNESIUM SERPL-MCNC: 2.1 MG/DL — SIGNIFICANT CHANGE UP (ref 1.6–2.6)
MANUAL SMEAR VERIFICATION: SIGNIFICANT CHANGE UP
MCHC RBC-ENTMCNC: 25.9 PG — LOW (ref 27–34)
MCHC RBC-ENTMCNC: 29.4 GM/DL — LOW (ref 32–36)
MCV RBC AUTO: 88.1 FL — SIGNIFICANT CHANGE UP (ref 80–100)
METHOD TYPE: SIGNIFICANT CHANGE UP
MONOCYTES # BLD AUTO: 1.63 K/UL — HIGH (ref 0–0.9)
MONOCYTES NFR BLD AUTO: 13.9 % — SIGNIFICANT CHANGE UP (ref 2–14)
NEUTROPHILS # BLD AUTO: 8.86 K/UL — HIGH (ref 1.8–7.4)
NEUTROPHILS NFR BLD AUTO: 75.6 % — SIGNIFICANT CHANGE UP (ref 43–77)
ORGANISM # SPEC MICROSCOPIC CNT: SIGNIFICANT CHANGE UP
ORGANISM # SPEC MICROSCOPIC CNT: SIGNIFICANT CHANGE UP
PHOSPHATE SERPL-MCNC: 2.6 MG/DL — SIGNIFICANT CHANGE UP (ref 2.5–4.5)
PLAT MORPH BLD: NORMAL — SIGNIFICANT CHANGE UP
PLATELET # BLD AUTO: 318 K/UL — SIGNIFICANT CHANGE UP (ref 150–400)
POIKILOCYTOSIS BLD QL AUTO: SLIGHT — SIGNIFICANT CHANGE UP
POTASSIUM SERPL-MCNC: 3.8 MMOL/L — SIGNIFICANT CHANGE UP (ref 3.5–5.3)
POTASSIUM SERPL-SCNC: 3.8 MMOL/L — SIGNIFICANT CHANGE UP (ref 3.5–5.3)
RBC # BLD: 3.78 M/UL — LOW (ref 4.2–5.8)
RBC # FLD: 15.5 % — HIGH (ref 10.3–14.5)
RBC BLD AUTO: ABNORMAL
SCHISTOCYTES BLD QL AUTO: SLIGHT — SIGNIFICANT CHANGE UP
SODIUM SERPL-SCNC: 143 MMOL/L — SIGNIFICANT CHANGE UP (ref 135–145)
SPECIMEN SOURCE: SIGNIFICANT CHANGE UP
WBC # BLD: 11.72 K/UL — HIGH (ref 3.8–10.5)
WBC # FLD AUTO: 11.72 K/UL — HIGH (ref 3.8–10.5)

## 2023-08-27 PROCEDURE — 99232 SBSQ HOSP IP/OBS MODERATE 35: CPT | Mod: GC

## 2023-08-27 RX ADMIN — FAMOTIDINE 20 MILLIGRAM(S): 10 INJECTION INTRAVENOUS at 11:47

## 2023-08-27 RX ADMIN — Medication 1 DROP(S): at 05:04

## 2023-08-27 RX ADMIN — Medication 1 DROP(S): at 15:27

## 2023-08-27 RX ADMIN — CEFTRIAXONE 100 MILLIGRAM(S): 500 INJECTION, POWDER, FOR SOLUTION INTRAMUSCULAR; INTRAVENOUS at 05:08

## 2023-08-27 RX ADMIN — POLYETHYLENE GLYCOL 3350 17 GRAM(S): 17 POWDER, FOR SOLUTION ORAL at 11:47

## 2023-08-27 RX ADMIN — Medication 1 DROP(S): at 22:03

## 2023-08-27 RX ADMIN — SENNA PLUS 2 TABLET(S): 8.6 TABLET ORAL at 22:03

## 2023-08-27 RX ADMIN — CHLORHEXIDINE GLUCONATE 1 APPLICATION(S): 213 SOLUTION TOPICAL at 11:47

## 2023-08-27 NOTE — PROGRESS NOTE ADULT - SUBJECTIVE AND OBJECTIVE BOX
Terra Urrutia MD    Internal Medicine Resident (PGY-3)  ___________________________________________________________________________________________________      LAINEY CIFUENTES 89y Male    Overnight events/subjective: No acute overnight events. Patient seen and examined at bedside.     Vital Signs Last 24 Hrs  T(C): 36.5 (27 Aug 2023 05:30), Max: 37.1 (26 Aug 2023 14:34)  T(F): 97.7 (27 Aug 2023 05:30), Max: 98.7 (26 Aug 2023 14:34)  HR: 82 (27 Aug 2023 05:30) (69 - 108)  BP: 116/60 (27 Aug 2023 05:30) (107/63 - 122/63)  BP(mean): --  RR: 18 (27 Aug 2023 05:30) (18 - 20)  SpO2: 94% (27 Aug 2023 05:30) (94% - 97%)    Parameters below as of 27 Aug 2023 05:30  Patient On (Oxygen Delivery Method): room air        PHYSICAL EXAM:      HOSPITAL MEDICATIONS:  MEDICATIONS  (STANDING):  artificial tears (preservative free) Ophthalmic Solution 1 Drop(s) Both EYES three times a day  cefTRIAXone   IVPB 1000 milliGRAM(s) IV Intermittent every 24 hours  chlorhexidine 4% Liquid 1 Application(s) Topical daily  famotidine    Tablet 20 milliGRAM(s) Oral daily  polyethylene glycol 3350 17 Gram(s) Oral daily  senna 2 Tablet(s) Oral at bedtime    MEDICATIONS  (PRN):      LABS:                        9.8    11.72 )-----------( 318      ( 27 Aug 2023 07:31 )             33.3     08-27    143  |  107  |  11  ----------------------------<  90  3.8   |  24  |  0.58    Ca    8.8      27 Aug 2023 07:31  Phos  2.6     08-27  Mg     2.1     08-27        Urinalysis Basic - ( 27 Aug 2023 07:31 )    Color: x / Appearance: x / SG: x / pH: x  Gluc: 90 mg/dL / Ketone: x  / Bili: x / Urobili: x   Blood: x / Protein: x / Nitrite: x   Leuk Esterase: x / RBC: x / WBC x   Sq Epi: x / Non Sq Epi: x / Bacteria: x         Terra Urrutia MD    Internal Medicine Resident (PGY-3)  ___________________________________________________________________________________________________      LAINEY CIFUENTES 89y Male    Overnight events/subjective: No acute overnight events. Patient seen and examined at bedside.     Vital Signs Last 24 Hrs  T(C): 36.5 (27 Aug 2023 05:30), Max: 37.1 (26 Aug 2023 14:34)  T(F): 97.7 (27 Aug 2023 05:30), Max: 98.7 (26 Aug 2023 14:34)  HR: 82 (27 Aug 2023 05:30) (69 - 108)  BP: 116/60 (27 Aug 2023 05:30) (107/63 - 122/63)  BP(mean): --  RR: 18 (27 Aug 2023 05:30) (18 - 20)  SpO2: 94% (27 Aug 2023 05:30) (94% - 97%)    Parameters below as of 27 Aug 2023 05:30  Patient On (Oxygen Delivery Method): room air        PHYSICAL EXAM:  GENERAL: NAD, lying in bed comfortably  HEENT: NC/AT  NECK: Supple, No JVD  CHEST/LUNG: CTAB, mild WOB, saturating well  HEART: RRR, no m/r/g  ABDOMEN: soft, NT, ND, BS+  EXTREMITIES:  2+ peripheral pulses, no edema  : coffey draining caleb urine  NERVOUS SYSTEM:  A&OxO  SKIN: No rashes or lesions    HOSPITAL MEDICATIONS:  MEDICATIONS  (STANDING):  artificial tears (preservative free) Ophthalmic Solution 1 Drop(s) Both EYES three times a day  cefTRIAXone   IVPB 1000 milliGRAM(s) IV Intermittent every 24 hours  chlorhexidine 4% Liquid 1 Application(s) Topical daily  famotidine    Tablet 20 milliGRAM(s) Oral daily  polyethylene glycol 3350 17 Gram(s) Oral daily  senna 2 Tablet(s) Oral at bedtime    MEDICATIONS  (PRN):      LABS:                        9.8    11.72 )-----------( 318      ( 27 Aug 2023 07:31 )             33.3     08-27    143  |  107  |  11  ----------------------------<  90  3.8   |  24  |  0.58    Ca    8.8      27 Aug 2023 07:31  Phos  2.6     08-27  Mg     2.1     08-27        Urinalysis Basic - ( 27 Aug 2023 07:31 )    Color: x / Appearance: x / SG: x / pH: x  Gluc: 90 mg/dL / Ketone: x  / Bili: x / Urobili: x   Blood: x / Protein: x / Nitrite: x   Leuk Esterase: x / RBC: x / WBC x   Sq Epi: x / Non Sq Epi: x / Bacteria: x

## 2023-08-27 NOTE — PROGRESS NOTE ADULT - PROBLEM SELECTOR PLAN 3
- UA grossly positive  - cont ceftriaxone  - f/u UCx - UA grossly positive  - cont ceftriaxone  - f/u UCx - proteus mirabilis, sensitivities pending

## 2023-08-27 NOTE — PROGRESS NOTE ADULT - PROBLEM SELECTOR PLAN 1
Likely 2/2 hemorrhagic cystitis    - hold xarelto  - monitor hgb  - trend cr  - TOV tonight  - Urology c/s -> NTD from their end inpatient Likely 2/2 hemorrhagic cystitis    - hold xarelto  - monitor hgb  - trend cr  - Urology c/s -> NTD from their end inpatient  - eventual TOV

## 2023-08-28 LAB
ANION GAP SERPL CALC-SCNC: 12 MMOL/L — SIGNIFICANT CHANGE UP (ref 5–17)
BASOPHILS # BLD AUTO: 0.04 K/UL — SIGNIFICANT CHANGE UP (ref 0–0.2)
BASOPHILS NFR BLD AUTO: 0.4 % — SIGNIFICANT CHANGE UP (ref 0–2)
BUN SERPL-MCNC: 14 MG/DL — SIGNIFICANT CHANGE UP (ref 7–23)
CALCIUM SERPL-MCNC: 8.8 MG/DL — SIGNIFICANT CHANGE UP (ref 8.4–10.5)
CHLORIDE SERPL-SCNC: 109 MMOL/L — HIGH (ref 96–108)
CO2 SERPL-SCNC: 24 MMOL/L — SIGNIFICANT CHANGE UP (ref 22–31)
CREAT SERPL-MCNC: 0.55 MG/DL — SIGNIFICANT CHANGE UP (ref 0.5–1.3)
EGFR: 95 ML/MIN/1.73M2 — SIGNIFICANT CHANGE UP
EOSINOPHIL # BLD AUTO: 0.26 K/UL — SIGNIFICANT CHANGE UP (ref 0–0.5)
EOSINOPHIL NFR BLD AUTO: 2.3 % — SIGNIFICANT CHANGE UP (ref 0–6)
GLUCOSE SERPL-MCNC: 111 MG/DL — HIGH (ref 70–99)
HCT VFR BLD CALC: 31.3 % — LOW (ref 39–50)
HGB BLD-MCNC: 9.9 G/DL — LOW (ref 13–17)
IMM GRANULOCYTES NFR BLD AUTO: 0.5 % — SIGNIFICANT CHANGE UP (ref 0–0.9)
LYMPHOCYTES # BLD AUTO: 0.82 K/UL — LOW (ref 1–3.3)
LYMPHOCYTES # BLD AUTO: 7.2 % — LOW (ref 13–44)
MAGNESIUM SERPL-MCNC: 2 MG/DL — SIGNIFICANT CHANGE UP (ref 1.6–2.6)
MCHC RBC-ENTMCNC: 26.8 PG — LOW (ref 27–34)
MCHC RBC-ENTMCNC: 31.6 GM/DL — LOW (ref 32–36)
MCV RBC AUTO: 84.8 FL — SIGNIFICANT CHANGE UP (ref 80–100)
MONOCYTES # BLD AUTO: 1.49 K/UL — HIGH (ref 0–0.9)
MONOCYTES NFR BLD AUTO: 13 % — SIGNIFICANT CHANGE UP (ref 2–14)
NEUTROPHILS # BLD AUTO: 8.75 K/UL — HIGH (ref 1.8–7.4)
NEUTROPHILS NFR BLD AUTO: 76.6 % — SIGNIFICANT CHANGE UP (ref 43–77)
NRBC # BLD: 0 /100 WBCS — SIGNIFICANT CHANGE UP (ref 0–0)
PHOSPHATE SERPL-MCNC: 2.1 MG/DL — LOW (ref 2.5–4.5)
PLATELET # BLD AUTO: 327 K/UL — SIGNIFICANT CHANGE UP (ref 150–400)
POTASSIUM SERPL-MCNC: 3.8 MMOL/L — SIGNIFICANT CHANGE UP (ref 3.5–5.3)
POTASSIUM SERPL-SCNC: 3.8 MMOL/L — SIGNIFICANT CHANGE UP (ref 3.5–5.3)
RBC # BLD: 3.69 M/UL — LOW (ref 4.2–5.8)
RBC # FLD: 15.3 % — HIGH (ref 10.3–14.5)
SODIUM SERPL-SCNC: 145 MMOL/L — SIGNIFICANT CHANGE UP (ref 135–145)
WBC # BLD: 11.42 K/UL — HIGH (ref 3.8–10.5)
WBC # FLD AUTO: 11.42 K/UL — HIGH (ref 3.8–10.5)

## 2023-08-28 PROCEDURE — 93306 TTE W/DOPPLER COMPLETE: CPT | Mod: 26

## 2023-08-28 PROCEDURE — 76376 3D RENDER W/INTRP POSTPROCES: CPT | Mod: 26

## 2023-08-28 PROCEDURE — 99222 1ST HOSP IP/OBS MODERATE 55: CPT | Mod: GC

## 2023-08-28 PROCEDURE — 99232 SBSQ HOSP IP/OBS MODERATE 35: CPT

## 2023-08-28 RX ORDER — ERTAPENEM SODIUM 1 G/1
1000 INJECTION, POWDER, LYOPHILIZED, FOR SOLUTION INTRAMUSCULAR; INTRAVENOUS ONCE
Refills: 0 | Status: COMPLETED | OUTPATIENT
Start: 2023-08-28 | End: 2023-08-28

## 2023-08-28 RX ORDER — ERTAPENEM SODIUM 1 G/1
INJECTION, POWDER, LYOPHILIZED, FOR SOLUTION INTRAMUSCULAR; INTRAVENOUS
Refills: 0 | Status: COMPLETED | OUTPATIENT
Start: 2023-08-28 | End: 2023-09-01

## 2023-08-28 RX ORDER — POTASSIUM PHOSPHATE, MONOBASIC POTASSIUM PHOSPHATE, DIBASIC 236; 224 MG/ML; MG/ML
15 INJECTION, SOLUTION INTRAVENOUS ONCE
Refills: 0 | Status: COMPLETED | OUTPATIENT
Start: 2023-08-28 | End: 2023-08-28

## 2023-08-28 RX ORDER — ERTAPENEM SODIUM 1 G/1
1000 INJECTION, POWDER, LYOPHILIZED, FOR SOLUTION INTRAMUSCULAR; INTRAVENOUS EVERY 24 HOURS
Refills: 0 | Status: COMPLETED | OUTPATIENT
Start: 2023-08-29 | End: 2023-08-31

## 2023-08-28 RX ADMIN — SENNA PLUS 2 TABLET(S): 8.6 TABLET ORAL at 22:03

## 2023-08-28 RX ADMIN — Medication 1 DROP(S): at 22:03

## 2023-08-28 RX ADMIN — POTASSIUM PHOSPHATE, MONOBASIC POTASSIUM PHOSPHATE, DIBASIC 62.5 MILLIMOLE(S): 236; 224 INJECTION, SOLUTION INTRAVENOUS at 08:45

## 2023-08-28 RX ADMIN — CEFTRIAXONE 100 MILLIGRAM(S): 500 INJECTION, POWDER, FOR SOLUTION INTRAMUSCULAR; INTRAVENOUS at 05:37

## 2023-08-28 RX ADMIN — CHLORHEXIDINE GLUCONATE 1 APPLICATION(S): 213 SOLUTION TOPICAL at 11:32

## 2023-08-28 RX ADMIN — FAMOTIDINE 20 MILLIGRAM(S): 10 INJECTION INTRAVENOUS at 11:34

## 2023-08-28 RX ADMIN — Medication 1 DROP(S): at 12:04

## 2023-08-28 RX ADMIN — Medication 1 DROP(S): at 05:37

## 2023-08-28 RX ADMIN — ERTAPENEM SODIUM 120 MILLIGRAM(S): 1 INJECTION, POWDER, LYOPHILIZED, FOR SOLUTION INTRAMUSCULAR; INTRAVENOUS at 08:45

## 2023-08-28 NOTE — PROGRESS NOTE ADULT - PROBLEM SELECTOR PLAN 2
Patient sats at 92% on RA on admission    - If pt becomes more symptomatic respiratory wise, can diurese and/or get thoracentesis  - hold off workup for malignancy given pt's age and comorbidities, which pt's daughter is in agreement with Likely 2/2 hemorrhagic cystitis    - hold xarelto  - monitor hgb  - trend cr  - Urology c/s -> NTD from their end inpatient  - eventual TOV

## 2023-08-28 NOTE — PROGRESS NOTE ADULT - PROBLEM SELECTOR PLAN 3
- UA grossly positive  - cont ceftriaxone  - f/u UCx - proteus mirabilis, sensitivities pending Patient sats at 92% on RA on admission    - If pt becomes more symptomatic respiratory wise, can diurese and/or get thoracentesis  - hold off workup for malignancy given pt's age and comorbidities, which pt's daughter is in agreement with

## 2023-08-28 NOTE — PROGRESS NOTE ADULT - SUBJECTIVE AND OBJECTIVE BOX
************************  Johnathon Montenegro MD  Internal Medicine PGY-1  ************************    Patient is a 89y old  Male who presents with a chief complaint of gross hematuria (28 Aug 2023 07:28)    Overnight no events. AOxO, unable to complete ROS.    MEDICATIONS  (STANDING):  artificial tears (preservative free) Ophthalmic Solution 1 Drop(s) Both EYES three times a day  chlorhexidine 4% Liquid 1 Application(s) Topical daily  ertapenem  IVPB      famotidine    Tablet 20 milliGRAM(s) Oral daily  polyethylene glycol 3350 17 Gram(s) Oral daily  senna 2 Tablet(s) Oral at bedtime    MEDICATIONS  (PRN):      CAPILLARY BLOOD GLUCOSE        I&O's Summary    27 Aug 2023 07:01  -  28 Aug 2023 07:00  --------------------------------------------------------  IN: 300 mL / OUT: 750 mL / NET: -450 mL        PHYSICAL EXAM:  Vital Signs Last 24 Hrs  T(C): 36.3 (28 Aug 2023 05:30), Max: 36.8 (27 Aug 2023 21:36)  T(F): 97.3 (28 Aug 2023 05:30), Max: 98.3 (27 Aug 2023 21:36)  HR: 69 (28 Aug 2023 07:50) (69 - 109)  BP: 116/60 (28 Aug 2023 07:50) (116/60 - 138/55)  BP(mean): --  RR: 20 (28 Aug 2023 07:50) (18 - 20)  SpO2: 93% (28 Aug 2023 07:50) (92% - 94%)    Parameters below as of 28 Aug 2023 05:30  Patient On (Oxygen Delivery Method): room air        PHYSICAL EXAM:  GENERAL: NAD, lying in bed comfortably  HEENT: NC/AT  NECK: Supple, No JVD  CHEST/LUNG: CTAB, no increased WOB  HEART: RRR, no m/r/g  ABDOMEN: soft, NT, ND, BS+  EXTREMITIES:  2+ peripheral pulses, no edema  NERVOUS SYSTEM:  A&OxO  SKIN: No rashes or lesions    LABS:                        9.9    11.42 )-----------( 327      ( 28 Aug 2023 07:01 )             31.3     08-28    145  |  109<H>  |  14  ----------------------------<  111<H>  3.8   |  24  |  0.55    Ca    8.8      28 Aug 2023 07:02  Phos  2.1     08-28  Mg     2.0     08-28            Urinalysis Basic - ( 28 Aug 2023 07:02 )    Color: x / Appearance: x / SG: x / pH: x  Gluc: 111 mg/dL / Ketone: x  / Bili: x / Urobili: x   Blood: x / Protein: x / Nitrite: x   Leuk Esterase: x / RBC: x / WBC x   Sq Epi: x / Non Sq Epi: x / Bacteria: x

## 2023-08-28 NOTE — CONSULT NOTE ADULT - ATTENDING COMMENTS
89 m with dementia A&Ox1 baseline, Afib on xarelto, PPM placement, pleural plaques, aspiration pna in past, chronic cough, p/w gross hematuria. u/a positive, urine cx: ESBL proteus  s/p coffey and now removed, hematuria resolved  s/p ceftriaxone now switched to erta after the cx    leukocytosis, hematuria and ESBL proteus UTI    * ertapenem 1 qd for 3 days  * monitor the bladder scan as the cfofey was removed    The above assessment and plan was discussed with the primary team    Tasneem Alegre MD  contact on teams  After 5pm and on weekends call [phone] 89 m with dementia A&Ox1 baseline, Afib on xarelto, PPM placement, pleural plaques, aspiration pna in past, chronic cough, p/w gross hematuria. u/a positive, urine cx: ESBL proteus  s/p coffey and now removed, hematuria resolved  s/p ceftriaxone now switched to erta after the cx    leukocytosis, hematuria and ESBL proteus UTI    * ertapenem 1 qd for 3 days  * monitor the bladder scan as the coffey was removed  * please call with questions  The above assessment and plan was discussed with the primary team    Tasneem Alegre MD  contact on teams  After 5pm and on weekends call [phone]

## 2023-08-28 NOTE — CONSULT NOTE ADULT - ASSESSMENT
Impression:  89M dementia A&Ox1 baseline, Afib on xarelto, PPM placement, pleural plaques, aspiration pna in past, chronic cough, chronic mild leg swelling presented to the hospital initallly with gross hematuria. Upon work up patient was found to have UTI with urine cultures growing ESBL Proteus    Assessment:  *ESBL Proteus UTI  *Leukocytosis likely secondary to above    Recommendations: PLEASE DEFER ALL CHANGES IN PLAN UNTIL SIGNED BY ATTENDING. All recommendations are tentative pending Attending Attestation.  - Continue Ertapenem, due to ESBL would be unable to use Amoxicillin/Clav and Amp/sulbactam  - patient will need to complete 5-7 day course     Jones Anderson DO, PGY-4   ID Fellow  [employer] Teams Preferred  After 5pm/weekends call [phone]  Impression:  89M dementia A&Ox1 baseline, Afib on xarelto, PPM placement, pleural plaques, aspiration pna in past, chronic cough, chronic mild leg swelling presented to the hospital initallly with gross hematuria. Upon work up patient was found to have UTI with urine cultures growing ESBL Proteus    Assessment:  *ESBL Proteus UTI  *Leukocytosis likely secondary to above    Recommendations:   - Continue Ertapenem to complete 3 days for simple cystitis, due to ESBL would be unable to use Amoxicillin/Clav and Amp/sulbactam    Jones Anderson DO, PGY-4   ID Fellow  [employer] Teams Preferred  After 5pm/weekends call [phone]

## 2023-08-28 NOTE — PROGRESS NOTE ADULT - PROBLEM SELECTOR PLAN 1
Likely 2/2 hemorrhagic cystitis    - hold xarelto  - monitor hgb  - trend cr  - Urology c/s -> NTD from their end inpatient  - eventual TOV - UA grossly positive  - cont ceftriaxone  - UCx - proteus mirabilis ESBL -> CTX switched to Ertapenem

## 2023-08-28 NOTE — CONSULT NOTE ADULT - SUBJECTIVE AND OBJECTIVE BOX
Patient is a 89y old  Male who presents with a chief complaint of gross hematuria (28 Aug 2023 07:28)    HPI:  89M dementia A&Ox1 baseline, Afib on xarelto, PPM placement, pleural plaques, aspiration pna in past, chronic cough, chronic mild leg swelling, pw gross hematuria.    At baseline pt walks with walker and assistance, feeds self, able to make some needs known. Developed gross hematuria about 1 week ago, was started on macrobid on 8/16/23, which pt is currently taking, and told to stop xarelto for 2 days. Pt resumed xarelto about 6 days ago, and pt developed gross hematuria starting yesterday with clots. Pt also complaining of intermittent abd pain and flank pain. Upon work up patient was found to have an elevated WBC count and a UA that was positive for UTI. UCx resulted in ESBL proteus and ID was consulted. Currently vital signs remain stable without fever but WBC remain slightly elevated.       REVIEW OF SYSTEMS  pending full examination      prior hospital charts reviewed [V]  primary team notes reviewed [V]  other consultant notes reviewed [V]    PAST MEDICAL & SURGICAL HISTORY:  Atrial fibrillation      Dementia    SOCIAL HISTORY:  - Denied smoking/vaping/alcohol/recreational drug use    FAMILY HISTORY:      Allergies  No Known Allergies        ANTIMICROBIALS:  ertapenem  IVPB        ANTIMICROBIALS (past 90 days):  MEDICATIONS  (STANDING):  cefTRIAXone   IVPB   100 mL/Hr IV Intermittent (08-24-23 @ 23:04)    cefTRIAXone   IVPB   100 mL/Hr IV Intermittent (08-28-23 @ 05:37)   100 mL/Hr IV Intermittent (08-27-23 @ 05:08)   100 mL/Hr IV Intermittent (08-26-23 @ 05:26)   100 mL/Hr IV Intermittent (08-25-23 @ 06:08)    ertapenem  IVPB   120 mL/Hr IV Intermittent (08-28-23 @ 08:45)        OTHER MEDS:   MEDICATIONS  (STANDING):  famotidine    Tablet 20 daily  polyethylene glycol 3350 17 daily  senna 2 at bedtime      VITALS:  Vital Signs Last 24 Hrs  T(F): 98.3 (08-28-23 @ 15:09), Max: 98.9 (08-24-23 @ 18:26)    Vital Signs Last 24 Hrs  HR: 89 (08-28-23 @ 15:09) (69 - 109)  BP: 130/73 (08-28-23 @ 15:09) (116/60 - 138/55)  RR: 18 (08-28-23 @ 15:09)  SpO2: 92% (08-28-23 @ 15:09) (92% - 94%)  Wt(kg): --    EXAM:  pending full examination      Labs:                        9.9    11.42 )-----------( 327      ( 28 Aug 2023 07:01 )             31.3     08-28    145  |  109<H>  |  14  ----------------------------<  111<H>  3.8   |  24  |  0.55    Ca    8.8      28 Aug 2023 07:02  Phos  2.1     08-28  Mg     2.0     08-28        WBC Trend:  WBC Count: 11.42 (08-28-23 @ 07:01)  WBC Count: 11.72 (08-27-23 @ 07:31)  WBC Count: 11.16 (08-26-23 @ 07:19)  WBC Count: 10.20 (08-25-23 @ 06:58)      Auto Neutrophil #: 8.75 K/uL (08-28-23 @ 07:01)  Auto Neutrophil #: 8.86 K/uL (08-27-23 @ 07:31)  Auto Neutrophil #: 7.18 K/uL (08-25-23 @ 06:58)  Auto Neutrophil #: 8.84 K/uL (08-24-23 @ 14:26)      Creatine Trend:  Creatinine: 0.55 (08-28)  Creatinine: 0.58 (08-27)  Creatinine: 0.56 (08-26)  Creatinine: 0.58 (08-25)      Liver Biochemical Testing Trend:  Alanine Aminotransferase (ALT/SGPT): 6 *L* (08-25)  Alanine Aminotransferase (ALT/SGPT): 10 (08-24)  Aspartate Aminotransferase (AST/SGOT): 9 (08-25-23 @ 06:58)  Aspartate Aminotransferase (AST/SGOT): 23 (08-24-23 @ 14:26)  Bilirubin Total: 0.6 (08-25)  Bilirubin Total: 0.8 (08-24)      Trend LDH      Auto Eosinophil %: 2.3 % (08-28-23 @ 07:01)  Auto Eosinophil %: 3.5 % (08-27-23 @ 07:31)      Urinalysis Basic - ( 28 Aug 2023 07:02 )    Color: x / Appearance: x / SG: x / pH: x  Gluc: 111 mg/dL / Ketone: x  / Bili: x / Urobili: x   Blood: x / Protein: x / Nitrite: x   Leuk Esterase: x / RBC: x / WBC x   Sq Epi: x / Non Sq Epi: x / Bacteria: x        MICROBIOLOGY:    MRSA PCR Result.: Yvonne (08-25-23 @ 19:37)      Culture - Blood (collected 25 Aug 2023 06:45)  Source: .Blood Blood-Venous  Preliminary Report:    No growth at 72 Hours    Culture - Blood (collected 25 Aug 2023 06:30)  Source: .Blood Blood-Peripheral  Preliminary Report:    No growth at 72 Hours    Culture - Urine (collected 24 Aug 2023 18:58)  Source: Clean Catch Clean Catch (Midstream)  Final Report:    >100,000 CFU/ml Proteus mirabilis ESBL  Organism: Proteus mirabilis ESBL  Organism: Proteus mirabilis ESBL    Sensitivities:      Method Type: DELPHINE      -  Amikacin: S <=16      -  Amoxicillin/Clavulanic Acid: S <=8/4      -  Ampicillin: R >16 These ampicillin results predict results for amoxicillin      -  Ampicillin/Sulbactam: S 8/4      -  Aztreonam: R >16      -  Cefazolin: R >16 For uncomplicated UTI with K. pneumoniae, E. coli, or P. mirablis: DELPHINE <=16 is sensitive and DELPHINE >=32 is resistant. This also predicts results for oral agents cefaclor, cefdinir, cefpodoxime, cefprozil, cefuroxime axetil, cephalexin and locarbef for uncomplicated UTI. Note that some isolates may be susceptible to these agents while testing resistant to cefazolin.      -  Cefepime: R >16      -  Ceftriaxone: R >32      -  Cefuroxime: R >16      -  Ciprofloxacin: R >2      -  Ertapenem: S <=0.5      -  Gentamicin: S <=2      -  Levofloxacin: R 4      -  Meropenem: S <=1      -  Nitrofurantoin: R >64 Should not be used to treat pyelonephritis      -  Piperacillin/Tazobactam: SDD 16      -  Tobramycin: S <=2      -  Trimethoprim/Sulfamethoxazole: R >2/38        RADIOLOGY:  < from: CT Abdomen and Pelvis w/ IV Cont (08.24.23 @ 17:57) >  IMPRESSION:  Urinary bladder wall thickening and inflammatory change, suspicious for   cystitis. Correlate with urinalysis.    Bilateral renal cortical scarring. No hydronephrosis.    A moderate right pleural effusion. Bilateral calcified pleural plaques.    < end of copied text >  < from: Xray Chest 1 View- PORTABLE-Routine (Xray Chest 1 View- PORTABLE-Routine .) (08.26.23 @ 13:09) >  IMPRESSION:    Moderate right pleural effusion and atelectasis.    < end of copied text >   Patient is a 89y old  Male who presents with a chief complaint of gross hematuria (28 Aug 2023 07:28)    HPI:  89M dementia A&Ox1 baseline, Afib on xarelto, PPM placement, pleural plaques, aspiration pna in past, chronic cough, chronic mild leg swelling, pw gross hematuria.    At baseline pt walks with walker and assistance, feeds self, able to make some needs known. Developed gross hematuria about 1 week ago, was started on macrobid on 8/16/23, which pt is currently taking, and told to stop xarelto for 2 days. Pt resumed xarelto about 6 days ago, and pt developed gross hematuria starting yesterday with clots. Pt also complaining of intermittent abd pain and flank pain. Upon work up patient was found to have an elevated WBC count and a UA that was positive for UTI. UCx resulted in ESBL proteus and ID was consulted. Currently vital signs remain stable without fever but WBC remain slightly elevated. Unable to obtain further history due to mental status.       REVIEW OF SYSTEMS  unable to be obtained due to mental status       prior hospital charts reviewed [V]  primary team notes reviewed [V]  other consultant notes reviewed [V]    PAST MEDICAL & SURGICAL HISTORY:  Atrial fibrillation      Dementia    SOCIAL HISTORY:  - Denied smoking/vaping/alcohol/recreational drug use    FAMILY HISTORY:      Allergies  No Known Allergies        ANTIMICROBIALS:  ertapenem  IVPB        ANTIMICROBIALS (past 90 days):  MEDICATIONS  (STANDING):  cefTRIAXone   IVPB   100 mL/Hr IV Intermittent (08-24-23 @ 23:04)    cefTRIAXone   IVPB   100 mL/Hr IV Intermittent (08-28-23 @ 05:37)   100 mL/Hr IV Intermittent (08-27-23 @ 05:08)   100 mL/Hr IV Intermittent (08-26-23 @ 05:26)   100 mL/Hr IV Intermittent (08-25-23 @ 06:08)    ertapenem  IVPB   120 mL/Hr IV Intermittent (08-28-23 @ 08:45)        OTHER MEDS:   MEDICATIONS  (STANDING):  famotidine    Tablet 20 daily  polyethylene glycol 3350 17 daily  senna 2 at bedtime      VITALS:  Vital Signs Last 24 Hrs  T(F): 98.3 (08-28-23 @ 15:09), Max: 98.9 (08-24-23 @ 18:26)    Vital Signs Last 24 Hrs  HR: 89 (08-28-23 @ 15:09) (69 - 109)  BP: 130/73 (08-28-23 @ 15:09) (116/60 - 138/55)  RR: 18 (08-28-23 @ 15:09)  SpO2: 92% (08-28-23 @ 15:09) (92% - 94%)  Wt(kg): --    EXAM:  General: Patient appears comfortable, no acute distress  HEENT: NCAT  CV: +S1/S2, RRR, no M/R/G  Lungs: No respiratory distress, CTA b/l, no wheezing, rales or rhonchi, poor respiratory effort  Abd:  BS4+, Soft, ND, no guarding  : No suprapubic tenderness, condom catheter present without urine present in reservoir   Neuro: AAOx0-1  Ext: No cyanosis, no edema, 2+ pulses in upper and lower extremities   Msk: freely moving upper extremities  Skin: No rash, no phlebitis, erythema or edema       Labs:                        9.9    11.42 )-----------( 327      ( 28 Aug 2023 07:01 )             31.3     08-28    145  |  109<H>  |  14  ----------------------------<  111<H>  3.8   |  24  |  0.55    Ca    8.8      28 Aug 2023 07:02  Phos  2.1     08-28  Mg     2.0     08-28        WBC Trend:  WBC Count: 11.42 (08-28-23 @ 07:01)  WBC Count: 11.72 (08-27-23 @ 07:31)  WBC Count: 11.16 (08-26-23 @ 07:19)  WBC Count: 10.20 (08-25-23 @ 06:58)      Auto Neutrophil #: 8.75 K/uL (08-28-23 @ 07:01)  Auto Neutrophil #: 8.86 K/uL (08-27-23 @ 07:31)  Auto Neutrophil #: 7.18 K/uL (08-25-23 @ 06:58)  Auto Neutrophil #: 8.84 K/uL (08-24-23 @ 14:26)      Creatine Trend:  Creatinine: 0.55 (08-28)  Creatinine: 0.58 (08-27)  Creatinine: 0.56 (08-26)  Creatinine: 0.58 (08-25)      Liver Biochemical Testing Trend:  Alanine Aminotransferase (ALT/SGPT): 6 *L* (08-25)  Alanine Aminotransferase (ALT/SGPT): 10 (08-24)  Aspartate Aminotransferase (AST/SGOT): 9 (08-25-23 @ 06:58)  Aspartate Aminotransferase (AST/SGOT): 23 (08-24-23 @ 14:26)  Bilirubin Total: 0.6 (08-25)  Bilirubin Total: 0.8 (08-24)      Trend LDH      Auto Eosinophil %: 2.3 % (08-28-23 @ 07:01)  Auto Eosinophil %: 3.5 % (08-27-23 @ 07:31)      Urinalysis Basic - ( 28 Aug 2023 07:02 )    Color: x / Appearance: x / SG: x / pH: x  Gluc: 111 mg/dL / Ketone: x  / Bili: x / Urobili: x   Blood: x / Protein: x / Nitrite: x   Leuk Esterase: x / RBC: x / WBC x   Sq Epi: x / Non Sq Epi: x / Bacteria: x        MICROBIOLOGY:    MRSA PCR Result.: Yvonne (08-25-23 @ 19:37)      Culture - Blood (collected 25 Aug 2023 06:45)  Source: .Blood Blood-Venous  Preliminary Report:    No growth at 72 Hours    Culture - Blood (collected 25 Aug 2023 06:30)  Source: .Blood Blood-Peripheral  Preliminary Report:    No growth at 72 Hours    Culture - Urine (collected 24 Aug 2023 18:58)  Source: Clean Catch Clean Catch (Midstream)  Final Report:    >100,000 CFU/ml Proteus mirabilis ESBL  Organism: Proteus mirabilis ESBL  Organism: Proteus mirabilis ESBL    Sensitivities:      Method Type: DELPHINE      -  Amikacin: S <=16      -  Amoxicillin/Clavulanic Acid: S <=8/4      -  Ampicillin: R >16 These ampicillin results predict results for amoxicillin      -  Ampicillin/Sulbactam: S 8/4      -  Aztreonam: R >16      -  Cefazolin: R >16 For uncomplicated UTI with K. pneumoniae, E. coli, or P. mirablis: DELPHINE <=16 is sensitive and DELPHINE >=32 is resistant. This also predicts results for oral agents cefaclor, cefdinir, cefpodoxime, cefprozil, cefuroxime axetil, cephalexin and locarbef for uncomplicated UTI. Note that some isolates may be susceptible to these agents while testing resistant to cefazolin.      -  Cefepime: R >16      -  Ceftriaxone: R >32      -  Cefuroxime: R >16      -  Ciprofloxacin: R >2      -  Ertapenem: S <=0.5      -  Gentamicin: S <=2      -  Levofloxacin: R 4      -  Meropenem: S <=1      -  Nitrofurantoin: R >64 Should not be used to treat pyelonephritis      -  Piperacillin/Tazobactam: SDD 16      -  Tobramycin: S <=2      -  Trimethoprim/Sulfamethoxazole: R >2/38        RADIOLOGY:  < from: CT Abdomen and Pelvis w/ IV Cont (08.24.23 @ 17:57) >  IMPRESSION:  Urinary bladder wall thickening and inflammatory change, suspicious for   cystitis. Correlate with urinalysis.    Bilateral renal cortical scarring. No hydronephrosis.    A moderate right pleural effusion. Bilateral calcified pleural plaques.    < end of copied text >  < from: Xray Chest 1 View- PORTABLE-Routine (Xray Chest 1 View- PORTABLE-Routine .) (08.26.23 @ 13:09) >  IMPRESSION:    Moderate right pleural effusion and atelectasis.    < end of copied text >   Patient is a 89y old  Male who presents with a chief complaint of gross hematuria (28 Aug 2023 07:28)    HPI:  89M dementia A&Ox1 baseline, Afib on xarelto, PPM placement, pleural plaques, aspiration pna in past, chronic cough, chronic mild leg swelling, pw gross hematuria.    At baseline pt walks with walker and assistance, feeds self, able to make some needs known. Developed gross hematuria about 1 week ago, was started on macrobid on 8/16/23, which pt is currently taking, and told to stop xarelto for 2 days. Pt resumed xarelto about 6 days ago, and pt developed gross hematuria starting yesterday with clots. Pt also complaining of intermittent abd pain and flank pain. Upon work up patient was found to have an elevated WBC count and a UA that was positive for UTI. UCx resulted in ESBL proteus and ID was consulted. Currently vital signs remain stable without fever but WBC remain slightly elevated. Unable to obtain further history due to mental status.       REVIEW OF SYSTEMS  unable to be obtained due to mental status       prior hospital charts reviewed [V]  primary team notes reviewed [V]  other consultant notes reviewed [V]    PAST MEDICAL & SURGICAL HISTORY:  Atrial fibrillation      Dementia    SOCIAL HISTORY:  , lives with children  no smoking/vaping/alcohol/recreational drug use  no recent travel    FAMILY HISTORY:  no recent febrile illness in family members    Allergies  No Known Allergies        ANTIMICROBIALS:  ertapenem  IVPB        ANTIMICROBIALS (past 90 days):  MEDICATIONS  (STANDING):  cefTRIAXone   IVPB   100 mL/Hr IV Intermittent (08-24-23 @ 23:04)    cefTRIAXone   IVPB   100 mL/Hr IV Intermittent (08-28-23 @ 05:37)   100 mL/Hr IV Intermittent (08-27-23 @ 05:08)   100 mL/Hr IV Intermittent (08-26-23 @ 05:26)   100 mL/Hr IV Intermittent (08-25-23 @ 06:08)    ertapenem  IVPB   120 mL/Hr IV Intermittent (08-28-23 @ 08:45)        OTHER MEDS:   MEDICATIONS  (STANDING):  famotidine    Tablet 20 daily  polyethylene glycol 3350 17 daily  senna 2 at bedtime      VITALS:  Vital Signs Last 24 Hrs  T(F): 98.3 (08-28-23 @ 15:09), Max: 98.9 (08-24-23 @ 18:26)    Vital Signs Last 24 Hrs  HR: 89 (08-28-23 @ 15:09) (69 - 109)  BP: 130/73 (08-28-23 @ 15:09) (116/60 - 138/55)  RR: 18 (08-28-23 @ 15:09)  SpO2: 92% (08-28-23 @ 15:09) (92% - 94%)  Wt(kg): --    EXAM:  General: Patient appears comfortable, no acute distress  Head: temporal waisting  eyes: no periorbital edema  ENT: poor dentition   CV: +S1/S2, RRR, no M/R/G  Lungs: No respiratory distress, CTA b/l, no wheezing, rales or rhonchi, poor respiratory effort  Abd:  BS4+, Soft, ND, no guarding  : No suprapubic tenderness, condom catheter present without urine present in reservoir   Neuro: AAOx0-1  Ext: No cyanosis, no edema, 2+ pulses in upper and lower extremities   Msk: freely moving upper extremities  Skin: No rash  vascular: no phlebitis  psych: normal affect       Labs:                        9.9    11.42 )-----------( 327      ( 28 Aug 2023 07:01 )             31.3     08-28    145  |  109<H>  |  14  ----------------------------<  111<H>  3.8   |  24  |  0.55    Ca    8.8      28 Aug 2023 07:02  Phos  2.1     08-28  Mg     2.0     08-28        WBC Trend:  WBC Count: 11.42 (08-28-23 @ 07:01)  WBC Count: 11.72 (08-27-23 @ 07:31)  WBC Count: 11.16 (08-26-23 @ 07:19)  WBC Count: 10.20 (08-25-23 @ 06:58)      Auto Neutrophil #: 8.75 K/uL (08-28-23 @ 07:01)  Auto Neutrophil #: 8.86 K/uL (08-27-23 @ 07:31)  Auto Neutrophil #: 7.18 K/uL (08-25-23 @ 06:58)  Auto Neutrophil #: 8.84 K/uL (08-24-23 @ 14:26)      Creatine Trend:  Creatinine: 0.55 (08-28)  Creatinine: 0.58 (08-27)  Creatinine: 0.56 (08-26)  Creatinine: 0.58 (08-25)      Liver Biochemical Testing Trend:  Alanine Aminotransferase (ALT/SGPT): 6 *L* (08-25)  Alanine Aminotransferase (ALT/SGPT): 10 (08-24)  Aspartate Aminotransferase (AST/SGOT): 9 (08-25-23 @ 06:58)  Aspartate Aminotransferase (AST/SGOT): 23 (08-24-23 @ 14:26)  Bilirubin Total: 0.6 (08-25)  Bilirubin Total: 0.8 (08-24)      Trend LDH      Auto Eosinophil %: 2.3 % (08-28-23 @ 07:01)  Auto Eosinophil %: 3.5 % (08-27-23 @ 07:31)      Urinalysis Basic - ( 28 Aug 2023 07:02 )    Color: x / Appearance: x / SG: x / pH: x  Gluc: 111 mg/dL / Ketone: x  / Bili: x / Urobili: x   Blood: x / Protein: x / Nitrite: x   Leuk Esterase: x / RBC: x / WBC x   Sq Epi: x / Non Sq Epi: x / Bacteria: x        MICROBIOLOGY:    MRSA PCR Result.: NotDetec (08-25-23 @ 19:37)      Culture - Blood (collected 25 Aug 2023 06:45)  Source: .Blood Blood-Venous  Preliminary Report:    No growth at 72 Hours    Culture - Blood (collected 25 Aug 2023 06:30)  Source: .Blood Blood-Peripheral  Preliminary Report:    No growth at 72 Hours    Culture - Urine (collected 24 Aug 2023 18:58)  Source: Clean Catch Clean Catch (Midstream)  Final Report:    >100,000 CFU/ml Proteus mirabilis ESBL  Organism: Proteus mirabilis ESBL  Organism: Proteus mirabilis ESBL    Sensitivities:      Method Type: EDLPHINE      -  Amikacin: S <=16      -  Amoxicillin/Clavulanic Acid: S <=8/4      -  Ampicillin: R >16 These ampicillin results predict results for amoxicillin      -  Ampicillin/Sulbactam: S 8/4      -  Aztreonam: R >16      -  Cefazolin: R >16 For uncomplicated UTI with K. pneumoniae, E. coli, or P. mirablis: DELPHINE <=16 is sensitive and DELPHINE >=32 is resistant. This also predicts results for oral agents cefaclor, cefdinir, cefpodoxime, cefprozil, cefuroxime axetil, cephalexin and locarbef for uncomplicated UTI. Note that some isolates may be susceptible to these agents while testing resistant to cefazolin.      -  Cefepime: R >16      -  Ceftriaxone: R >32      -  Cefuroxime: R >16      -  Ciprofloxacin: R >2      -  Ertapenem: S <=0.5      -  Gentamicin: S <=2      -  Levofloxacin: R 4      -  Meropenem: S <=1      -  Nitrofurantoin: R >64 Should not be used to treat pyelonephritis      -  Piperacillin/Tazobactam: SDD 16      -  Tobramycin: S <=2      -  Trimethoprim/Sulfamethoxazole: R >2/38        RADIOLOGY:  < from: CT Abdomen and Pelvis w/ IV Cont (08.24.23 @ 17:57) >  IMPRESSION:  Urinary bladder wall thickening and inflammatory change, suspicious for   cystitis. Correlate with urinalysis.    Bilateral renal cortical scarring. No hydronephrosis.    A moderate right pleural effusion. Bilateral calcified pleural plaques.    < end of copied text >  < from: Xray Chest 1 View- PORTABLE-Routine (Xray Chest 1 View- PORTABLE-Routine .) (08.26.23 @ 13:09) >  IMPRESSION:    Moderate right pleural effusion and atelectasis.    < end of copied text >

## 2023-08-29 DIAGNOSIS — A49.8 OTHER BACTERIAL INFECTIONS OF UNSPECIFIED SITE: ICD-10-CM

## 2023-08-29 LAB
ANION GAP SERPL CALC-SCNC: 15 MMOL/L — SIGNIFICANT CHANGE UP (ref 5–17)
BASOPHILS # BLD AUTO: 0.11 K/UL — SIGNIFICANT CHANGE UP (ref 0–0.2)
BASOPHILS NFR BLD AUTO: 0.9 % — SIGNIFICANT CHANGE UP (ref 0–2)
BUN SERPL-MCNC: 12 MG/DL — SIGNIFICANT CHANGE UP (ref 7–23)
CALCIUM SERPL-MCNC: 9.1 MG/DL — SIGNIFICANT CHANGE UP (ref 8.4–10.5)
CHLORIDE SERPL-SCNC: 110 MMOL/L — HIGH (ref 96–108)
CO2 SERPL-SCNC: 21 MMOL/L — LOW (ref 22–31)
CREAT SERPL-MCNC: 0.55 MG/DL — SIGNIFICANT CHANGE UP (ref 0.5–1.3)
EGFR: 95 ML/MIN/1.73M2 — SIGNIFICANT CHANGE UP
EOSINOPHIL # BLD AUTO: 0.31 K/UL — SIGNIFICANT CHANGE UP (ref 0–0.5)
EOSINOPHIL NFR BLD AUTO: 2.6 % — SIGNIFICANT CHANGE UP (ref 0–6)
GLUCOSE SERPL-MCNC: 80 MG/DL — SIGNIFICANT CHANGE UP (ref 70–99)
HCT VFR BLD CALC: 32.4 % — LOW (ref 39–50)
HGB BLD-MCNC: 9.8 G/DL — LOW (ref 13–17)
LYMPHOCYTES # BLD AUTO: 1.12 K/UL — SIGNIFICANT CHANGE UP (ref 1–3.3)
LYMPHOCYTES # BLD AUTO: 9.5 % — LOW (ref 13–44)
MAGNESIUM SERPL-MCNC: 2.1 MG/DL — SIGNIFICANT CHANGE UP (ref 1.6–2.6)
MANUAL SMEAR VERIFICATION: SIGNIFICANT CHANGE UP
MCHC RBC-ENTMCNC: 26.3 PG — LOW (ref 27–34)
MCHC RBC-ENTMCNC: 30.2 GM/DL — LOW (ref 32–36)
MCV RBC AUTO: 87.1 FL — SIGNIFICANT CHANGE UP (ref 80–100)
MONOCYTES # BLD AUTO: 1.72 K/UL — HIGH (ref 0–0.9)
MONOCYTES NFR BLD AUTO: 14.6 % — HIGH (ref 2–14)
NEUTROPHILS # BLD AUTO: 8.52 K/UL — HIGH (ref 1.8–7.4)
NEUTROPHILS NFR BLD AUTO: 72.4 % — SIGNIFICANT CHANGE UP (ref 43–77)
PHOSPHATE SERPL-MCNC: 2.8 MG/DL — SIGNIFICANT CHANGE UP (ref 2.5–4.5)
PLAT MORPH BLD: NORMAL — SIGNIFICANT CHANGE UP
PLATELET # BLD AUTO: 318 K/UL — SIGNIFICANT CHANGE UP (ref 150–400)
POTASSIUM SERPL-MCNC: 4.5 MMOL/L — SIGNIFICANT CHANGE UP (ref 3.5–5.3)
POTASSIUM SERPL-SCNC: 4.5 MMOL/L — SIGNIFICANT CHANGE UP (ref 3.5–5.3)
RBC # BLD: 3.72 M/UL — LOW (ref 4.2–5.8)
RBC # FLD: 15.6 % — HIGH (ref 10.3–14.5)
RBC BLD AUTO: SIGNIFICANT CHANGE UP
SODIUM SERPL-SCNC: 146 MMOL/L — HIGH (ref 135–145)
WBC # BLD: 11.77 K/UL — HIGH (ref 3.8–10.5)
WBC # FLD AUTO: 11.77 K/UL — HIGH (ref 3.8–10.5)

## 2023-08-29 PROCEDURE — 99232 SBSQ HOSP IP/OBS MODERATE 35: CPT

## 2023-08-29 RX ORDER — ACETAMINOPHEN 500 MG
650 TABLET ORAL EVERY 6 HOURS
Refills: 0 | Status: DISCONTINUED | OUTPATIENT
Start: 2023-08-29 | End: 2023-08-31

## 2023-08-29 RX ADMIN — POLYETHYLENE GLYCOL 3350 17 GRAM(S): 17 POWDER, FOR SOLUTION ORAL at 13:06

## 2023-08-29 RX ADMIN — ERTAPENEM SODIUM 120 MILLIGRAM(S): 1 INJECTION, POWDER, LYOPHILIZED, FOR SOLUTION INTRAMUSCULAR; INTRAVENOUS at 08:46

## 2023-08-29 RX ADMIN — CHLORHEXIDINE GLUCONATE 1 APPLICATION(S): 213 SOLUTION TOPICAL at 13:05

## 2023-08-29 RX ADMIN — Medication 1 DROP(S): at 05:50

## 2023-08-29 RX ADMIN — FAMOTIDINE 20 MILLIGRAM(S): 10 INJECTION INTRAVENOUS at 13:05

## 2023-08-29 RX ADMIN — Medication 1 DROP(S): at 21:42

## 2023-08-29 RX ADMIN — Medication 1 DROP(S): at 13:06

## 2023-08-29 RX ADMIN — SENNA PLUS 2 TABLET(S): 8.6 TABLET ORAL at 21:42

## 2023-08-29 NOTE — PROGRESS NOTE ADULT - PROBLEM SELECTOR PLAN 5
- aspiration precaution  - swallow eval complete -> rec minced and moist  - pt eval complete -> rec Home PT Likely 2/2 uti    - monitor for hypothermia, trend wbc  - blood cultures ordered as pt c/o flank pain  - CT abd/pelvis IMPRESSION:  Urinary bladder wall thickening and inflammatory change, suspicious for   cystitis. Correlate with urinalysis.  Bilateral renal cortical scarring. No hydronephrosis.  A moderate right pleural effusion. Bilateral calcified pleural plaques.

## 2023-08-29 NOTE — PROGRESS NOTE ADULT - PROBLEM SELECTOR PLAN 2
Likely 2/2 hemorrhagic cystitis    - hold xarelto  - monitor hgb  - trend cr  - Urology c/s -> NTD from their end inpatient  - eventual TOV - UCx - proteus mirabilis ESBL -> CTX switched to Ertapenem (finished 8/30)

## 2023-08-29 NOTE — PROGRESS NOTE ADULT - PROBLEM SELECTOR PLAN 4
Likely 2/2 uti    - monitor for hypothermia, trend wbc  - blood cultures ordered as pt c/o flank pain  - CT abd/pelvis IMPRESSION:  Urinary bladder wall thickening and inflammatory change, suspicious for   cystitis. Correlate with urinalysis.  Bilateral renal cortical scarring. No hydronephrosis.  A moderate right pleural effusion. Bilateral calcified pleural plaques. Patient sats at 92% on RA on admission    - If pt becomes more symptomatic respiratory wise, can diurese and/or get thoracentesis  - hold off workup for malignancy given pt's age and comorbidities, which pt's daughter is in agreement with

## 2023-08-29 NOTE — PROGRESS NOTE ADULT - PROBLEM SELECTOR PLAN 1
- UCx - proteus mirabilis ESBL -> CTX switched to Ertapenem (finished 8/30) Infection in urinary tract  - Ertapenem as ordered, course to run until 9/30

## 2023-08-29 NOTE — PROGRESS NOTE ADULT - PROBLEM SELECTOR PLAN 3
Patient sats at 92% on RA on admission    - If pt becomes more symptomatic respiratory wise, can diurese and/or get thoracentesis  - hold off workup for malignancy given pt's age and comorbidities, which pt's daughter is in agreement with Likely 2/2 hemorrhagic cystitis    - hold xarelto  - monitor hgb  - trend cr  - Urology c/s -> NTD from their end inpatient

## 2023-08-29 NOTE — PROGRESS NOTE ADULT - PROBLEM SELECTOR PLAN 6
- hold xarelto iso hematuria - aspiration precaution  - swallow eval complete -> rec minced and moist  - pt eval complete -> rec Home PT

## 2023-08-29 NOTE — PROGRESS NOTE ADULT - SUBJECTIVE AND OBJECTIVE BOX
************************  Johnathon Montenegro MD  Internal Medicine PGY-1  ************************    Patient is a 89y old  Male who presents with a chief complaint of gross hematuria (29 Aug 2023 07:10)    Overnight no events, patient AOxO, unable to complete ROS.    MEDICATIONS  (STANDING):  artificial tears (preservative free) Ophthalmic Solution 1 Drop(s) Both EYES three times a day  chlorhexidine 4% Liquid 1 Application(s) Topical daily  ertapenem  IVPB      ertapenem  IVPB 1000 milliGRAM(s) IV Intermittent every 24 hours  famotidine    Tablet 20 milliGRAM(s) Oral daily  polyethylene glycol 3350 17 Gram(s) Oral daily  senna 2 Tablet(s) Oral at bedtime    MEDICATIONS  (PRN):  acetaminophen     Tablet .. 650 milliGRAM(s) Oral every 6 hours PRN Mild Pain (1 - 3)      CAPILLARY BLOOD GLUCOSE        I&O's Summary    28 Aug 2023 07:01  -  29 Aug 2023 07:00  --------------------------------------------------------  IN: 150 mL / OUT: 500 mL / NET: -350 mL        PHYSICAL EXAM:  Vital Signs Last 24 Hrs  T(C): 36.3 (29 Aug 2023 05:28), Max: 37.1 (28 Aug 2023 18:26)  T(F): 97.3 (29 Aug 2023 05:28), Max: 98.8 (28 Aug 2023 18:26)  HR: 87 (29 Aug 2023 05:28) (77 - 89)  BP: 120/65 (29 Aug 2023 05:28) (110/62 - 130/73)  BP(mean): --  RR: 18 (29 Aug 2023 05:28) (18 - 18)  SpO2: 94% (29 Aug 2023 05:28) (81% - 98%)    Parameters below as of 29 Aug 2023 05:28  Patient On (Oxygen Delivery Method): room air        PHYSICAL EXAM:  GENERAL: NAD, lying in bed comfortably  HEENT: NC/AT  NECK: Supple, No JVD  CHEST/LUNG: CTAB, no increased WOB  HEART: RRR, no m/r/g  ABDOMEN: soft, NT, ND, BS+  EXTREMITIES:  2+ peripheral pulses, no edema  NERVOUS SYSTEM:  A&OxO  SKIN: No rashes or lesions    LABS:                        9.8    11.77 )-----------( 318      ( 29 Aug 2023 07:38 )             32.4     08-29    146<H>  |  110<H>  |  12  ----------------------------<  80  4.5   |  21<L>  |  0.55    Ca    9.1      29 Aug 2023 07:36  Phos  2.8     08-29  Mg     2.1     08-29            Urinalysis Basic - ( 29 Aug 2023 07:36 )    Color: x / Appearance: x / SG: x / pH: x  Gluc: 80 mg/dL / Ketone: x  / Bili: x / Urobili: x   Blood: x / Protein: x / Nitrite: x   Leuk Esterase: x / RBC: x / WBC x   Sq Epi: x / Non Sq Epi: x / Bacteria: x

## 2023-08-30 ENCOUNTER — TRANSCRIPTION ENCOUNTER (OUTPATIENT)
Age: 88
End: 2023-08-30

## 2023-08-30 LAB
ANION GAP SERPL CALC-SCNC: 10 MMOL/L — SIGNIFICANT CHANGE UP (ref 5–17)
BASOPHILS # BLD AUTO: 0.04 K/UL — SIGNIFICANT CHANGE UP (ref 0–0.2)
BASOPHILS NFR BLD AUTO: 0.4 % — SIGNIFICANT CHANGE UP (ref 0–2)
BUN SERPL-MCNC: 12 MG/DL — SIGNIFICANT CHANGE UP (ref 7–23)
CALCIUM SERPL-MCNC: 8.9 MG/DL — SIGNIFICANT CHANGE UP (ref 8.4–10.5)
CHLORIDE SERPL-SCNC: 110 MMOL/L — HIGH (ref 96–108)
CO2 SERPL-SCNC: 25 MMOL/L — SIGNIFICANT CHANGE UP (ref 22–31)
CREAT SERPL-MCNC: 0.52 MG/DL — SIGNIFICANT CHANGE UP (ref 0.5–1.3)
CULTURE RESULTS: SIGNIFICANT CHANGE UP
CULTURE RESULTS: SIGNIFICANT CHANGE UP
EGFR: 96 ML/MIN/1.73M2 — SIGNIFICANT CHANGE UP
EOSINOPHIL # BLD AUTO: 0.37 K/UL — SIGNIFICANT CHANGE UP (ref 0–0.5)
EOSINOPHIL NFR BLD AUTO: 3.9 % — SIGNIFICANT CHANGE UP (ref 0–6)
GLUCOSE SERPL-MCNC: 102 MG/DL — HIGH (ref 70–99)
HCT VFR BLD CALC: 32.7 % — LOW (ref 39–50)
HGB BLD-MCNC: 10.1 G/DL — LOW (ref 13–17)
IMM GRANULOCYTES NFR BLD AUTO: 0.6 % — SIGNIFICANT CHANGE UP (ref 0–0.9)
LYMPHOCYTES # BLD AUTO: 0.95 K/UL — LOW (ref 1–3.3)
LYMPHOCYTES # BLD AUTO: 9.9 % — LOW (ref 13–44)
MAGNESIUM SERPL-MCNC: 2 MG/DL — SIGNIFICANT CHANGE UP (ref 1.6–2.6)
MCHC RBC-ENTMCNC: 26.4 PG — LOW (ref 27–34)
MCHC RBC-ENTMCNC: 30.9 GM/DL — LOW (ref 32–36)
MCV RBC AUTO: 85.4 FL — SIGNIFICANT CHANGE UP (ref 80–100)
MONOCYTES # BLD AUTO: 1.37 K/UL — HIGH (ref 0–0.9)
MONOCYTES NFR BLD AUTO: 14.3 % — HIGH (ref 2–14)
NEUTROPHILS # BLD AUTO: 6.78 K/UL — SIGNIFICANT CHANGE UP (ref 1.8–7.4)
NEUTROPHILS NFR BLD AUTO: 70.9 % — SIGNIFICANT CHANGE UP (ref 43–77)
NRBC # BLD: 0 /100 WBCS — SIGNIFICANT CHANGE UP (ref 0–0)
PHOSPHATE SERPL-MCNC: 2.3 MG/DL — LOW (ref 2.5–4.5)
PLATELET # BLD AUTO: 356 K/UL — SIGNIFICANT CHANGE UP (ref 150–400)
POTASSIUM SERPL-MCNC: 3.8 MMOL/L — SIGNIFICANT CHANGE UP (ref 3.5–5.3)
POTASSIUM SERPL-SCNC: 3.8 MMOL/L — SIGNIFICANT CHANGE UP (ref 3.5–5.3)
RBC # BLD: 3.83 M/UL — LOW (ref 4.2–5.8)
RBC # FLD: 15.2 % — HIGH (ref 10.3–14.5)
SODIUM SERPL-SCNC: 145 MMOL/L — SIGNIFICANT CHANGE UP (ref 135–145)
SPECIMEN SOURCE: SIGNIFICANT CHANGE UP
SPECIMEN SOURCE: SIGNIFICANT CHANGE UP
WBC # BLD: 9.57 K/UL — SIGNIFICANT CHANGE UP (ref 3.8–10.5)
WBC # FLD AUTO: 9.57 K/UL — SIGNIFICANT CHANGE UP (ref 3.8–10.5)

## 2023-08-30 PROCEDURE — 99239 HOSP IP/OBS DSCHRG MGMT >30: CPT

## 2023-08-30 RX ORDER — NITROFURANTOIN MACROCRYSTAL 50 MG
1 CAPSULE ORAL
Refills: 0 | DISCHARGE

## 2023-08-30 RX ORDER — FUROSEMIDE 40 MG
1 TABLET ORAL
Refills: 0 | DISCHARGE

## 2023-08-30 RX ORDER — POTASSIUM PHOSPHATE, MONOBASIC POTASSIUM PHOSPHATE, DIBASIC 236; 224 MG/ML; MG/ML
15 INJECTION, SOLUTION INTRAVENOUS ONCE
Refills: 0 | Status: COMPLETED | OUTPATIENT
Start: 2023-08-30 | End: 2023-08-30

## 2023-08-30 RX ORDER — POTASSIUM CHLORIDE 20 MEQ
1 PACKET (EA) ORAL
Refills: 0 | DISCHARGE

## 2023-08-30 RX ORDER — RIVAROXABAN 15 MG-20MG
1 KIT ORAL
Refills: 0 | DISCHARGE

## 2023-08-30 RX ADMIN — FAMOTIDINE 20 MILLIGRAM(S): 10 INJECTION INTRAVENOUS at 12:28

## 2023-08-30 RX ADMIN — SENNA PLUS 2 TABLET(S): 8.6 TABLET ORAL at 22:18

## 2023-08-30 RX ADMIN — CHLORHEXIDINE GLUCONATE 1 APPLICATION(S): 213 SOLUTION TOPICAL at 12:33

## 2023-08-30 RX ADMIN — Medication 1 DROP(S): at 22:18

## 2023-08-30 RX ADMIN — POTASSIUM PHOSPHATE, MONOBASIC POTASSIUM PHOSPHATE, DIBASIC 62.5 MILLIMOLE(S): 236; 224 INJECTION, SOLUTION INTRAVENOUS at 12:25

## 2023-08-30 RX ADMIN — Medication 1 DROP(S): at 05:31

## 2023-08-30 RX ADMIN — ERTAPENEM SODIUM 120 MILLIGRAM(S): 1 INJECTION, POWDER, LYOPHILIZED, FOR SOLUTION INTRAMUSCULAR; INTRAVENOUS at 09:48

## 2023-08-30 RX ADMIN — POLYETHYLENE GLYCOL 3350 17 GRAM(S): 17 POWDER, FOR SOLUTION ORAL at 12:28

## 2023-08-30 NOTE — CHART NOTE - NSCHARTNOTEFT_GEN_A_CORE
Gel overlay  It's also medically necessary this patient get a gel overlay for bed and support. He requires frequent body repositioning not feasible without this. Pillows/wedges alone have been considered and deemed insufficient. Gel overlay  Due to diagnosis of UTI and dementia and having limited mobility, patient will require gel overlay mattress. He requires frequent body repositioning not feasible without this. Pillows/wedges alone have been considered and deemed insufficient.

## 2023-08-30 NOTE — PROGRESS NOTE ADULT - SUBJECTIVE AND OBJECTIVE BOX
************************  Johnathon Montenegro MD  Internal Medicine PGY-1  ************************    Patient is a 89y old  Male who presents with a chief complaint of gross hematuria (30 Aug 2023 06:51)    Overnight no events. Patient AOxO, unable to complete ROS.    MEDICATIONS  (STANDING):  artificial tears (preservative free) Ophthalmic Solution 1 Drop(s) Both EYES three times a day  chlorhexidine 4% Liquid 1 Application(s) Topical daily  ertapenem  IVPB      ertapenem  IVPB 1000 milliGRAM(s) IV Intermittent every 24 hours  famotidine    Tablet 20 milliGRAM(s) Oral daily  polyethylene glycol 3350 17 Gram(s) Oral daily  potassium phosphate IVPB 15 milliMole(s) IV Intermittent once  senna 2 Tablet(s) Oral at bedtime    MEDICATIONS  (PRN):  acetaminophen     Tablet .. 650 milliGRAM(s) Oral every 6 hours PRN Mild Pain (1 - 3)      CAPILLARY BLOOD GLUCOSE        I&O's Summary    29 Aug 2023 07:01  -  30 Aug 2023 07:00  --------------------------------------------------------  IN: 150 mL / OUT: 425 mL / NET: -275 mL        PHYSICAL EXAM:  Vital Signs Last 24 Hrs  T(C): 36.4 (30 Aug 2023 05:51), Max: 36.9 (29 Aug 2023 13:48)  T(F): 97.5 (30 Aug 2023 05:51), Max: 98.4 (29 Aug 2023 13:48)  HR: 79 (30 Aug 2023 05:51) (79 - 103)  BP: 110/63 (30 Aug 2023 05:51) (103/59 - 119/76)  BP(mean): --  RR: 18 (30 Aug 2023 05:51) (18 - 18)  SpO2: 96% (30 Aug 2023 05:51) (93% - 96%)    Parameters below as of 30 Aug 2023 05:51  Patient On (Oxygen Delivery Method): room air        PHYSICAL EXAM:  GENERAL: NAD, lying in bed comfortably  HEENT: NC/AT  NECK: Supple, No JVD  CHEST/LUNG: CTAB, no increased WOB  HEART: RRR, no m/r/g  ABDOMEN: soft, NT, ND, BS+  EXTREMITIES:  2+ peripheral pulses, no edema  NERVOUS SYSTEM:  A&OxO  SKIN: No rashes or lesions    LABS:                        10.1   9.57  )-----------( 356      ( 30 Aug 2023 07:04 )             32.7     08-30    145  |  110<H>  |  12  ----------------------------<  102<H>  3.8   |  25  |  0.52    Ca    8.9      30 Aug 2023 07:06  Phos  2.3     08-30  Mg     2.0     08-30            Urinalysis Basic - ( 30 Aug 2023 07:06 )    Color: x / Appearance: x / SG: x / pH: x  Gluc: 102 mg/dL / Ketone: x  / Bili: x / Urobili: x   Blood: x / Protein: x / Nitrite: x   Leuk Esterase: x / RBC: x / WBC x   Sq Epi: x / Non Sq Epi: x / Bacteria: x

## 2023-08-30 NOTE — PROGRESS NOTE ADULT - ATTENDING COMMENTS
89M PMH dementia (aaox0-1 at baseline), afib on xarelto, presents with UTI with hematuria, and now with UTI with proteus ESBL    #ESBL UTI  - Cultures as described above   - Continue with ertapenem as ordered  - May need PICC line    Remainder as above
89M PMH dementia (aaox0-1 at baseline), afib on xarelto, presents with UTI with hematuria, and now with UTI with proteus ESBL    #ESBL UTI  - Cultures as described above   - Continue with ertapenem as ordered, course to run until 8/30    Remainder as above  Medically ready for discharge home following completion of ertapenem
89M with dementia AOx1 at baseline, afib on xarelto, PPM, pleural plauqes, aspiration in past, chronic cough, chornic mild leg swelling, admitted for gross hematuria in setting of UTI.    # UTI  - f/u ucx  - on ceftriaxone 8/24, plan for 5 days  - monitor wbc, fever curve    # Hematuria  Bladder wall thickening with gross hematuria  - treat UTI as above  - urology evaluation, may need cysto if hematuria or bladder wall thickening does not resolve  - passing urine freely with coffey at present but clots and sediment present  - will need to flush if drainage stops +/- CBI  - hold xarelto    # Pleural effusion  - large R sided plef seen on CTAP  - if respiratory status were to worsen, would obtain pulmonary consult for diagnostic and therapeutic thoracentesis (if this is within goals of care)  - monitor SaO2, goal > 92%    # Afib  - CHADsVASC at least 2, will need to clarify other history  - hold xarelto in setting of acute bleed as well as possible need for procedure (thoracentesis)    # GOC  - spoke with daughter at bedside who is his health care proxy (she will bring in forms) regarding code status and limitations to any interventions. daughter is agreeable to all medical interventions as long as there is a reasonable chance of recovery. I explained that at his age, if he were to require CPR, given his age and comorbidities the expectation of recovery would be very low and would subject him to a lot of pain from compressions. She would not like to make any limitations at this time, and therefore patient is full code. Time spent in person: 24 minutes
89M with dementia AOx1 at baseline, afib on xarelto, PPM, pleural plauqes, aspiration in past, chronic cough, chornic mild leg swelling, admitted for gross hematuria in setting of UTI.  Hematuria now improved.  Maintain coffey for now  F/u urine cultures, so far growing GNR  F/u blood cultures  Cont ceftriaxone  Cont to hold xarelto, will likely resume tomorrow if urine remains clear  L R sided plef seen on CTAP, f/u CXR  Rest per resident documentation above    [facility]  Tiff Garrison MD  Available on TEAMS 8a-8p    51 minutes spent on total encounter. The necessity of the time spent during the encounter on this date of service was due to:     - preparing to see the patient (eg, review of tests, documents)  - performing a medically appropriate examination and/or evaluation  - referring and communicating with other health care professionals  - documenting clinical information in the electronic or other health record  - care coordination.
89M with dementia AOx1 at baseline, afib on xarelto, PPM, pleural plauqes, aspiration in past, chronic cough, chornic mild leg swelling, admitted for gross hematuria in setting of UTI.  Seen and examined sitting in chair at bedside. AAO X 1 to self. Answers simple questions. Denies pain.  Hematuria now improved. TOV tomorrow  F/u final urine cultures, so far growing proteus; BCx NGTD  Cont ceftriaxone  Cont to hold xarelto, resume tomorrow  L R sided plef seen on CTAP, f/u CXR. Pt without respiratory symptoms  If remains stable, can start DC planning 8/28  Rest per resident documentation above    [facility] of Hospital Medicine  Tiff Garrison MD  Available on TEAMS 8a-8p    40 minutes spent on total encounter. The necessity of the time spent during the encounter on this date of service was due to:     - preparing to see the patient (eg, review of tests, documents)  - performing a medically appropriate examination and/or evaluation  - referring and communicating with other health care professionals  - documenting clinical information in the electronic or other health record  - care coordination.
Care as above  Discussed with resident  To complete ertapenem

## 2023-08-30 NOTE — DISCHARGE NOTE NURSING/CASE MANAGEMENT/SOCIAL WORK - PATIENT PORTAL LINK FT
You can access the FollowMyHealth Patient Portal offered by [facility] by registering at the following website: [email or web address]. By joining [email or web address]’s FollowMyHealth portal, you will also be able to view your health information using other applications (apps) compatible with our system.

## 2023-08-30 NOTE — CHART NOTE - NSCHARTNOTEFT_GEN_A_CORE
Mikayla Lift  It is also medically necessary for this patient to receive a mikayla lift. This patient is a two person max assist and will need this mikayla lift for transfers from bed to chair

## 2023-08-30 NOTE — DISCHARGE NOTE NURSING/CASE MANAGEMENT/SOCIAL WORK - NSTRANSFERBELONGINGSDISPO_GEN_A_NUR
Patient asking for refill on his inhaler, states he's down to the last 10 inhalations.    Medication pending, pharmacy loaded.   given to family

## 2023-08-30 NOTE — DISCHARGE NOTE NURSING/CASE MANAGEMENT/SOCIAL WORK - NSDCFUADDAPPT_GEN_ALL_CORE_FT
Please follow up with your primary care doctor listed above.  Please HOLD Xarelto and lasix until follow up with your doctor.

## 2023-08-30 NOTE — PROGRESS NOTE ADULT - PROBLEM SELECTOR PLAN 3
Likely 2/2 hemorrhagic cystitis    - hold xarelto  - monitor hgb  - trend cr  - Urology c/s -> NTD from their end inpatient

## 2023-08-31 VITALS
SYSTOLIC BLOOD PRESSURE: 105 MMHG | OXYGEN SATURATION: 92 % | DIASTOLIC BLOOD PRESSURE: 66 MMHG | HEART RATE: 94 BPM | TEMPERATURE: 98 F | RESPIRATION RATE: 18 BRPM

## 2023-08-31 PROCEDURE — 85730 THROMBOPLASTIN TIME PARTIAL: CPT

## 2023-08-31 PROCEDURE — 93306 TTE W/DOPPLER COMPLETE: CPT

## 2023-08-31 PROCEDURE — 84100 ASSAY OF PHOSPHORUS: CPT

## 2023-08-31 PROCEDURE — 81001 URINALYSIS AUTO W/SCOPE: CPT

## 2023-08-31 PROCEDURE — 99239 HOSP IP/OBS DSCHRG MGMT >30: CPT

## 2023-08-31 PROCEDURE — 82550 ASSAY OF CK (CPK): CPT

## 2023-08-31 PROCEDURE — 86900 BLOOD TYPING SEROLOGIC ABO: CPT

## 2023-08-31 PROCEDURE — 99285 EMERGENCY DEPT VISIT HI MDM: CPT

## 2023-08-31 PROCEDURE — 87641 MR-STAPH DNA AMP PROBE: CPT

## 2023-08-31 PROCEDURE — 85027 COMPLETE CBC AUTOMATED: CPT

## 2023-08-31 PROCEDURE — 36415 COLL VENOUS BLD VENIPUNCTURE: CPT

## 2023-08-31 PROCEDURE — 86901 BLOOD TYPING SEROLOGIC RH(D): CPT

## 2023-08-31 PROCEDURE — 71045 X-RAY EXAM CHEST 1 VIEW: CPT

## 2023-08-31 PROCEDURE — 74177 CT ABD & PELVIS W/CONTRAST: CPT | Mod: MA

## 2023-08-31 PROCEDURE — 97162 PT EVAL MOD COMPLEX 30 MIN: CPT

## 2023-08-31 PROCEDURE — 87077 CULTURE AEROBIC IDENTIFY: CPT

## 2023-08-31 PROCEDURE — 83735 ASSAY OF MAGNESIUM: CPT

## 2023-08-31 PROCEDURE — 80053 COMPREHEN METABOLIC PANEL: CPT

## 2023-08-31 PROCEDURE — 87040 BLOOD CULTURE FOR BACTERIA: CPT

## 2023-08-31 PROCEDURE — 97530 THERAPEUTIC ACTIVITIES: CPT

## 2023-08-31 PROCEDURE — 86850 RBC ANTIBODY SCREEN: CPT

## 2023-08-31 PROCEDURE — 87186 SC STD MICRODIL/AGAR DIL: CPT

## 2023-08-31 PROCEDURE — 87640 STAPH A DNA AMP PROBE: CPT

## 2023-08-31 PROCEDURE — 97110 THERAPEUTIC EXERCISES: CPT

## 2023-08-31 PROCEDURE — 87086 URINE CULTURE/COLONY COUNT: CPT

## 2023-08-31 PROCEDURE — 85610 PROTHROMBIN TIME: CPT

## 2023-08-31 PROCEDURE — 76376 3D RENDER W/INTRP POSTPROCES: CPT

## 2023-08-31 PROCEDURE — 92526 ORAL FUNCTION THERAPY: CPT

## 2023-08-31 PROCEDURE — 80048 BASIC METABOLIC PNL TOTAL CA: CPT

## 2023-08-31 PROCEDURE — 92610 EVALUATE SWALLOWING FUNCTION: CPT

## 2023-08-31 PROCEDURE — 85025 COMPLETE CBC W/AUTO DIFF WBC: CPT

## 2023-08-31 PROCEDURE — 83690 ASSAY OF LIPASE: CPT

## 2023-08-31 PROCEDURE — 84443 ASSAY THYROID STIM HORMONE: CPT

## 2023-08-31 RX ORDER — CYCLOSPORINE 0.5 MG/ML
1 EMULSION OPHTHALMIC
Qty: 0 | Refills: 0 | DISCHARGE

## 2023-08-31 RX ADMIN — FAMOTIDINE 20 MILLIGRAM(S): 10 INJECTION INTRAVENOUS at 12:39

## 2023-08-31 RX ADMIN — CHLORHEXIDINE GLUCONATE 1 APPLICATION(S): 213 SOLUTION TOPICAL at 12:42

## 2023-08-31 RX ADMIN — Medication 1 DROP(S): at 05:55

## 2023-08-31 RX ADMIN — ERTAPENEM SODIUM 120 MILLIGRAM(S): 1 INJECTION, POWDER, LYOPHILIZED, FOR SOLUTION INTRAMUSCULAR; INTRAVENOUS at 08:36

## 2023-08-31 RX ADMIN — POLYETHYLENE GLYCOL 3350 17 GRAM(S): 17 POWDER, FOR SOLUTION ORAL at 12:39

## 2023-08-31 NOTE — PROGRESS NOTE ADULT - PROVIDER SPECIALTY LIST ADULT
Internal Medicine
Internal Medicine
Urology
Internal Medicine

## 2023-08-31 NOTE — PROGRESS NOTE ADULT - PROBLEM SELECTOR PLAN 8
DVT: SCDs  Diet: pureed, minced moist  Dispo: Home PT

## 2023-08-31 NOTE — PROGRESS NOTE ADULT - SUBJECTIVE AND OBJECTIVE BOX
***************************************************************  [facility] (Ji-Cheng) [facility] PGY2  Internal Medicine   ***************************************************************    LAINEY CIFUENTES  89y  MRN: [id number]    Patient is a 89y old  Male who presents with a chief complaint of gross hematuria (30 Aug 2023 06:51)      Subjective: no events ON. Denies fever, CP, SOB, abn pain, N/V, dysuria. Tolerating diet.      MEDICATIONS  (STANDING):  artificial tears (preservative free) Ophthalmic Solution 1 Drop(s) Both EYES three times a day  chlorhexidine 4% Liquid 1 Application(s) Topical daily  ertapenem  IVPB      ertapenem  IVPB 1000 milliGRAM(s) IV Intermittent every 24 hours  famotidine    Tablet 20 milliGRAM(s) Oral daily  polyethylene glycol 3350 17 Gram(s) Oral daily  senna 2 Tablet(s) Oral at bedtime    MEDICATIONS  (PRN):  acetaminophen     Tablet .. 650 milliGRAM(s) Oral every 6 hours PRN Mild Pain (1 - 3)      Objective:    Vitals: Vital Signs Last 24 Hrs  T(C): 36.3 (08-31-23 @ 05:15), Max: 36.7 (08-30-23 @ 21:41)  T(F): 97.4 (08-31-23 @ 05:15), Max: 98.1 (08-30-23 @ 21:41)  HR: 82 (08-31-23 @ 05:15) (82 - 101)  BP: 112/69 (08-31-23 @ 05:15) (105/63 - 131/69)  BP(mean): --  RR: 18 (08-31-23 @ 05:15) (18 - 18)  SpO2: 93% (08-31-23 @ 05:15) (90% - 93%)            I&O's Summary    29 Aug 2023 07:01  -  30 Aug 2023 07:00  --------------------------------------------------------  IN: 150 mL / OUT: 425 mL / NET: -275 mL    30 Aug 2023 07:01  -  31 Aug 2023 06:16  --------------------------------------------------------  IN: 250 mL / OUT: 450 mL / NET: -200 mL        PHYSICAL EXAM:  GENERAL: NAD  HEAD:  Atraumatic, Normocephalic  EYES: EOMI, conjunctiva and sclera clear  CHEST/LUNG: Clear to auscultation bilaterally; No rales, rhonchi, wheezing, or rubs  HEART: Regular rate and rhythm; No murmurs, rubs, or gallops  ABDOMEN: Soft, Nontender, Nondistended;   SKIN: No rashes or lesions  NERVOUS SYSTEM:  Alert & Oriented X3, no focal deficits    LABS:  08-30    145  |  110<H>  |  12  ----------------------------<  102<H>  3.8   |  25  |  0.52  08-29    146<H>  |  110<H>  |  12  ----------------------------<  80  4.5   |  21<L>  |  0.55  08-28    145  |  109<H>  |  14  ----------------------------<  111<H>  3.8   |  24  |  0.55    Ca    8.9      30 Aug 2023 07:06  Ca    9.1      29 Aug 2023 07:36  Ca    8.8      28 Aug 2023 07:02  Phos  2.3     08-30  Mg     2.0     08-30                      Urinalysis Basic - ( 30 Aug 2023 07:06 )    Color: x / Appearance: x / SG: x / pH: x  Gluc: 102 mg/dL / Ketone: x  / Bili: x / Urobili: x   Blood: x / Protein: x / Nitrite: x   Leuk Esterase: x / RBC: x / WBC x   Sq Epi: x / Non Sq Epi: x / Bacteria: x                              10.1   9.57  )-----------( 356      ( 30 Aug 2023 07:04 )             32.7                         9.8    11.77 )-----------( 318      ( 29 Aug 2023 07:38 )             32.4                         9.9    11.42 )-----------( 327      ( 28 Aug 2023 07:01 )             31.3     CAPILLARY BLOOD GLUCOSE          RADIOLOGY & ADDITIONAL TESTS:    Imaging Personally Reviewed:  [x ] YES  [ ] NO    Consultants involved in case:   Consultant(s) Notes Reviewed:  [ x] YES  [ ] NO:   Care Discussed with Consultants/Other Providers [x ] YES  [ ] NO

## 2023-08-31 NOTE — PROGRESS NOTE ADULT - ASSESSMENT
89M dementia A&Ox1 baseline, Afib on xarelto, PPM placement, pleural plaques, aspiration pna in past, chronic cough, chronic mild leg swelling, pw gross hematuria, uti, sepsis
89M dementia A&Ox1 baseline, Afib on xarelto, PPM placement, pleural plaques, aspiration pna in past, chronic cough, chronic mild leg swelling, pw gross hematuria, uti, sepsis
89M dementia A&Ox1 baseline, Afib on xarelto, PPM placement, pleural plaques admitted with gross hematuria in the setting of a UTI, CT scan showed no obstruction or hydronephrosis, bladder wall thickening.     -There is no indication for CBI at this time, no gross hematuria present  -CBI is used to reduce the risk of clot formation while grossly hematuric and maintain coffey patency, coffey is draining well  -Follow up urine culture and treat UTI  -Encourage hydration  -Bowel regimen to prevent straining with urination   -Outpatient follow up     [facility]  [address] [place] [id number]  [phone]       
89M dementia A&Ox1 baseline, Afib on xarelto, PPM placement, pleural plaques, aspiration pna in past, chronic cough, chronic mild leg swelling, pw gross hematuria, uti, sepsis

## 2024-01-04 NOTE — PROGRESS NOTE ADULT - PROBLEM SELECTOR PLAN 4
Pt arrives to triage for left sided facial swelling that began around 3 days ago. Swelling is localized to left cheek. Pt reports no dental pain, but mostly sinus pain. Pt denies any airway involvement and is able to manage secretions and airway.          Likely 2/2 uti    - monitor for hypothermia, trend wbc  - blood cultures ordered as pt c/o flank pain  - CT abd/pelvis IMPRESSION:  Urinary bladder wall thickening and inflammatory change, suspicious for   cystitis. Correlate with urinalysis.  Bilateral renal cortical scarring. No hydronephrosis.  A moderate right pleural effusion. Bilateral calcified pleural plaques.